# Patient Record
Sex: FEMALE | Race: WHITE | Employment: FULL TIME | URBAN - METROPOLITAN AREA
[De-identification: names, ages, dates, MRNs, and addresses within clinical notes are randomized per-mention and may not be internally consistent; named-entity substitution may affect disease eponyms.]

---

## 2017-10-23 ENCOUNTER — TELEPHONE (OUTPATIENT)
Dept: FAMILY MEDICINE CLINIC | Age: 62
End: 2017-10-23

## 2018-06-27 ENCOUNTER — OFFICE VISIT (OUTPATIENT)
Dept: ORTHOPEDIC SURGERY | Age: 63
End: 2018-06-27

## 2018-06-27 VITALS
SYSTOLIC BLOOD PRESSURE: 152 MMHG | HEART RATE: 63 BPM | DIASTOLIC BLOOD PRESSURE: 82 MMHG | HEIGHT: 55 IN | BODY MASS INDEX: 60.17 KG/M2 | WEIGHT: 260 LBS

## 2018-06-27 DIAGNOSIS — M51.36 DDD (DEGENERATIVE DISC DISEASE), LUMBAR: ICD-10-CM

## 2018-06-27 DIAGNOSIS — M54.16 LUMBAR RADICULITIS: ICD-10-CM

## 2018-06-27 DIAGNOSIS — M54.5 LOW BACK PAIN, UNSPECIFIED BACK PAIN LATERALITY, UNSPECIFIED CHRONICITY, WITH SCIATICA PRESENCE UNSPECIFIED: Primary | ICD-10-CM

## 2018-06-27 PROCEDURE — 99203 OFFICE O/P NEW LOW 30 MIN: CPT | Performed by: PHYSICIAN ASSISTANT

## 2018-06-27 RX ORDER — PREDNISONE 10 MG/1
TABLET ORAL
Qty: 26 TABLET | Refills: 0 | Status: SHIPPED | OUTPATIENT
Start: 2018-06-27 | End: 2018-08-20

## 2018-06-28 ENCOUNTER — TELEPHONE (OUTPATIENT)
Dept: ORTHOPEDIC SURGERY | Age: 63
End: 2018-06-28

## 2018-07-11 ENCOUNTER — HOSPITAL ENCOUNTER (OUTPATIENT)
Dept: PHYSICAL THERAPY | Age: 63
Discharge: HOME | End: 2018-07-12
Admitting: PHYSICIAN ASSISTANT

## 2018-07-11 NOTE — FLOWSHEET NOTE
Flexion      Lumbar Ext      Lumbar SB L/R      Lumbar rotation L/R     Strength Hip ext      ABD      TrA       RESTRICTIONS/PRECAUTIONS: none    Exercises/Interventions:     Therapeutic Ex sets/reps comments   Prone prop 2 x 2 min HEP   Prone press up 2 x 10 HEP   Prone hip ext 2 x 10 HEP   TA brace 2 x 10 : 5\" HEP   Clamshell 2 x 10 HEP                                                          Manual Intervention      PROM hips, HS and ITB stretch 5 min Right   Central PA L1-S1, Si 4 min                                  NMR re-education      TA activation                           Therapeutic Exercise and NMR EXR  [x] (05486) Provided verbal/tactile cueing for activities related to strengthening, flexibility, endurance, ROM  for improvements in proximal hip and core control with self care, mobility, lifting and ambulation.  [] (42435) Provided verbal/tactile cueing for activities related to improving balance, coordination, kinesthetic sense, posture, motor skill, proprioception  to assist with core control in self care, mobility, lifting, and ambulation.      Therapeutic Activities:    [] (51428 or 44552) Provided verbal/tactile cueing for activities related to improving balance, coordination, kinesthetic sense, posture, motor skill, proprioception and motor activation to allow for proper function  with self care and ADLs  [] (01907) Provided training and instruction to the patient for proper core and proximal hip recruitment and positioning with ambulation re-education     Home Exercise Program:    [x] (79172) Reviewed/Progressed HEP activities related to strengthening, flexibility, endurance, ROM of core, proximal hip and LE for functional self-care, mobility, lifting and ambulation   [] (29715) Reviewed/Progressed HEP activities related to improving balance, coordination, kinesthetic sense, posture, motor skill, proprioception of core, proximal hip and LE for self care, mobility, lifting, and ambulation symptoms or restriction. 5. Patient will be able to stand for 1+ hour with no increase in symptoms    New or Updated Goals (if applicable):  [x] No change to goals established upon initial eval/last progress note:  New Goals:    Progression Towards Functional goals:   [] Patient is progressing as expected towards functional goals listed. [] Progression is slowed due to complexities listed. [] Progression has been slowed due to co-morbidities.   [x] Plan just implemented, too soon to assess goals progression  [] Other:     ASSESSMENT:    [] Improvement noted relative to goals:  [] No Improvement noted related to goals:  Summary/Patient's response to treatment: See Eval    Treatment/Activity Tolerance:  [x] Patient tolerated treatment well [] Patient limited by fatique  [] Patient limited by pain  [] Patient limited by other medical complications  [] Other:     Prognosis: [x] Good [] Fair  [] Poor    Patient Requires Follow-up: [x] Yes  [] No    PLAN: See eval  [] Continue per plan of care [] Alter current plan (see comments)  [x] Plan of care initiated [] Hold pending MD visit [] Discharge    Electronically signed by: Lola Rehman PT

## 2018-07-11 NOTE — PLAN OF CARE
[facility], [address]  Phone: [phone]  Fax [phone]    Physical Therapy Certification    Dear Referring Practitioner: Alek Bliss ,    We had the pleasure of evaluating the following patient for physical therapy services at [address] [place] [address]. A summary of our findings can be found in the initial assessment below. This includes our plan of care. If you have any questions or concerns regarding these findings, please do not hesitate to contact me at the office phone number checked above. Thank you for the referral.       Physician Signature:_______________________________Date:__________________  By signing above (or electronic signature), therapists plan is approved by physician    Patient: Alonso Lorenzo   : 1955   MRN: [id number]  Referring Physician: Referring Practitioner: Alek Bliss       Evaluation Date: 2018      Medical Diagnosis Information:  Diagnosis: M54.5 (ICD-10-CM) - Low back pain, unspecified back pain laterality, unspecified chronicity, with sciatica presence unspecified   Treatment Diagnosis: M54.5 Low back pain, unspecified back pain laterality, unspecified chronicity, with sciatica presence unspecified                                         Insurance information: PT Insurance Information: 18 ANTHEM - $50/25 - $[id number] - $0CP - 25PT - 80/20% - NEED AUTH AFTER 25V     Precautions/ Contra-indications: none  Latex Allergy:  [x]NO      []YES  Preferred Language for Healthcare:   [x]English       []other:    SUBJECTIVE: Patient stated complaint: 4 weeks ago could not move in the morning due to Right leg being so tight and painful, massage therapist helped did dry needling, Had steroid pack did not due a whole lot. Getting a little better each day but still having NT down leg into foot, pain and muscle cramps present throughout right leg. Limiting ADLs    Can sit, cannot stand or walk much. Relevant Medical History:unremarkable   Functional Disability Index/G-Codes:  PT G-Codes  Functional Assessment Tool Used: Modified Oswestry  Score: 30%  Functional Limitation: Changing and maintaining body position  Changing and Maintaining Body Position Current Status (): At least 20 percent but less than 40 percent impaired, limited or restricted  Changing and Maintaining Body Position Goal Status ():  At least 1 percent but less than 20 percent impaired, limited or restricted    Pain Scale: 5/10  Easing factors: rest, prone  Provocative factors: prolonged activity     Type: [x]Constant   []Intermittent  []Radiating []Localized []other:     Numbness/Tingling: down right leg into foot    Occupation/School: full time     Living Status/Prior Level of Function: Independent with ADLs and IADLs, walking, yoga    OBJECTIVE:     ROM     LUMBAR FLEX Gets tight at knees, able to slowly get to toes    LUMBAR EXT No issue    Sidebend To knee     Rotation 80%      LEFT RIGHT   HIP Flex WNL    HIP Abd     HIP ER     HIP IR     Knee Flex     Knee ext     Hamstring FLEX Lacking 30 on R LE    Piriformis                Strength  LEFT RIGHT   MfA     TrA     HIP Flexors 4    HIP Abductors 3+    HIP ER     Hip IR     Knee EXT (quad) 4+    Knee Flex (HS) 4+    Ankle DF     Ankle PF     Great Toe Ext       Reflexes/Sensation:    [x]Dermatomes/Myotomes intact    []UE Reflexes     []Normal []Hypo      []Hyper   []LE Reflexes     []Normal []Hypo      []Hyper   []Babinski/Clonus/Hoffmans:    []Other:    Joint mobility:    [x]Normal    []Hypo   []Hyper    Palpation: Increased tissue tension in R pirformis, tight right HS, min TTP over spinous process with central PA T10-S1 and SI joint    Functional Mobility/Transfers: independent     Posture: forward head, anterior pelvic tilt, decreased WB on R LE    Bandages/Dressings/Incisions: none    Gait: (include devices/WB status) assessment instrument and/or measurable assessment of functional outcome. [x] EVAL (LOW) 23213 (typically 20 minutes face-to-face)  [] EVAL (MOD) 30391 (typically 30 minutes face-to-face)  [] EVAL (HIGH) 02945 (typically 45 minutes face-to-face)  [] RE-EVAL         PLAN: Begin PT focusing on: proximal hip mobilizations, LB mobs, LB core activation, proximal hip activation, and HEP    Frequency/Duration:  1-2 days per week for 10 Weeks:  Interventions:  [x]  Therapeutic exercise including: strength training, ROM, for LE, Glutes and core   [x]  NMR activation and proprioception for glutes , LE and Core   [x]  Manual therapy as indicated for Hip complex, LE and spine to include: Dry Needling/IASTM, STM, PROM, Gr I-IV mobilizations, manipulation. [x]  Modalities as needed that may include: thermal agents, E-stim, Biofeedback, US, iontophoresis as indicated  [x]  Patient education on joint protection, postural re-education, activity modification, progression of HEP. HEP instruction: (see scanned forms)    GOALS:  Patient stated goal: Patient would like to get stronger and alleviate pain    Therapist goals for Patient:   Short Term Goals: To be achieved in: 2 weeks  1. Independent in HEP and progression per patient tolerance, in order to prevent re-injury. 2. Patient will have a decrease in pain to facilitate improvement in movement, function, and ADLs as indicated by Functional Deficits. Long Term Goals: To be achieved in: 10 weeks  1. Disability index score of 20% or less for the modified oswestry  to assist with reaching prior level of function. 2. Patient will demonstrate increased AROM to WNL, good LS mobility, good hip ROM to allow for proper joint functioning as indicated by patients Functional Deficits. 3. Patient will demonstrate an increase in Strength to good proximal hip and core activation to allow for proper functional mobility as indicated by patients Functional Deficits.    4. Patient will

## 2018-07-13 ENCOUNTER — HOSPITAL ENCOUNTER (OUTPATIENT)
Dept: PHYSICAL THERAPY | Age: 63
Discharge: HOME | End: 2018-07-14
Admitting: PHYSICIAN ASSISTANT

## 2018-07-13 NOTE — FLOWSHEET NOTE
[facility], [address]  Phone: [phone]  Fax [phone]    Physical Therapy Daily Treatment Note  Date:  2018    Patient Name:  Corina Peraza    :  1955  MRN: [id number]  Restrictions/Precautions:    Physician Information:  Referring Practitioner: Barney Palm   Medical/Treatment Diagnosis Information:  · Diagnosis: M54.5 (ICD-10-CM) - Low back pain, unspecified back pain laterality, unspecified chronicity, with sciatica presence unspecified   (right sided)  Treatment Diagnosis:M54.5 Low back pain, unspecified back pain laterality, unspecified chronicity, with sciatica presence unspecified[x] Conservative / [] Surgical - DOS:  Therapy Diagnosis/Practice Pattern:  Practice Pattern F: Spinal Disorders  Insurance/Certification information:  PT Insurance Information: 18 ANTHEM - $50/25 - $1305DED - $0CP - 25PT - 80/20% - NEED AUTH AFTER 25V  Plan of care signed: [x] YES  [] NO  Number of Comorbidities:  [x]0     []1-2    []3+  Date of Patient follow up with Physician:     G-Code (if applicable):      Date G-Code Applied:         Progress Note: [x]  Yes  []  No  Next due by: Visit #10        Latex Allergy:  [x]NO      []YES  Preferred Language for Healthcare:   [x]English       []other:    Visit # Insurance Allowable Reporting Period   2 25 then needs auth Begin Date: 2018               End Date:      RECERT DUE BY: 10 weeks    SUBJECTIVE:  Pt only has pain with standing and walking. Pt gets relief with sitting. OBJECTIVE: See eval  Observation:Has relief of symptoms with repetitive SLR.     Palpation:     Test used Initial score Current Score   Pain Summary VAS     Functional questionnaire [place]     ROM Lumbar Flexion      Lumbar Ext      Lumbar SB L/R      Lumbar rotation L/R     Strength Hip ext      ABD      TrA       RESTRICTIONS/PRECAUTIONS: none    Exercises/Interventions: Therapeutic Ex sets/reps comments   Prone prop 2 x 2 min HEP   Prone press up 2 x 10 HEP   Prone hip ext 2 x 10 HEP   TA brace 2 x 10 : 5\" HEP   Clamshell 2 x 10 HEP   bridges X 15    Mod Fig 4 s 3x  :20    Piriformis s 3x  ;20          SB DKC/    SB  LTR X 20/   X 10     Slouch correct X 10 Caused more tingling   Incline s 4x  ;20                         Manual Intervention      PROM hips, HS and ITB stretch 5 min Right   Central PA L1-S1, Si 4 min         Neural glides SLR X 10    STM of piriformis with ball 4 min    STM of ITB and HS  Roller   4 min              NMR re-education      TA activation                           Therapeutic Exercise and NMR EXR  [x] (49621) Provided verbal/tactile cueing for activities related to strengthening, flexibility, endurance, ROM  for improvements in proximal hip and core control with self care, mobility, lifting and ambulation.  [] (20009) Provided verbal/tactile cueing for activities related to improving balance, coordination, kinesthetic sense, posture, motor skill, proprioception  to assist with core control in self care, mobility, lifting, and ambulation.      Therapeutic Activities:    [] (60221 or 65602) Provided verbal/tactile cueing for activities related to improving balance, coordination, kinesthetic sense, posture, motor skill, proprioception and motor activation to allow for proper function  with self care and ADLs  [] (32127) Provided training and instruction to the patient for proper core and proximal hip recruitment and positioning with ambulation re-education     Home Exercise Program:    [x] (78695) Reviewed/Progressed HEP activities related to strengthening, flexibility, endurance, ROM of core, proximal hip and LE for functional self-care, mobility, lifting and ambulation   [] (21686) Reviewed/Progressed HEP activities related to improving balance, coordination, kinesthetic sense, posture, motor skill, proprioception of core, proximal hip and LE for self care, mobility, lifting, and ambulation      Manual Treatments:  PROM / STM / Oscillations-Mobs:  G-I, II, III, IV (PA's, Inf., Post.)  [x] (08266) Provided manual therapy to mobilize proximal hip and LS spine soft tissue/joints for the purpose of modulating pain, promoting relaxation,  increasing ROM, reducing/eliminating soft tissue swelling/inflammation/restriction, improving soft tissue extensibility and allowing for proper ROM for normal function with self care, mobility, lifting and ambulation. Modalities:   Esu interferential  X 15 min. Charges:  Timed Code Treatment Minutes: 30   Total Treatment Minutes: 45     [] EVAL (LOW) 13267 (typically 20 minutes face-to-face)  [] EVAL (MOD) 56114 (typically 30 minutes face-to-face)  [] EVAL (HIGH) 01415 (typically 45 minutes face-to-face)  [] RE-EVAL     [x] WE(97737) x  1   [] IONTO  [] NMR (39731) x      [] VASO  [x] Manual (57511) x  1    [] Other:  [] TA x       [] Mech Traction (50667)  [] ES(attended) (11992)      [x] ES (un) (72437):     Goals: Patient stated goal: Patient would like to get stronger and alleviate pain    Therapist goals for Patient:   Short Term Goals: To be achieved in: 2 weeks  1. Independent in HEP and progression per patient tolerance, in order to prevent re-injury. 2. Patient will have a decrease in pain to facilitate improvement in movement, function, and ADLs as indicated by Functional Deficits. Long Term Goals: To be achieved in: 10 weeks  1. Disability index score of 20% or less for the modified oswestry  to assist with reaching prior level of function. 2. Patient will demonstrate increased AROM to WNL, good LS mobility, good hip ROM to allow for proper joint functioning as indicated by patients Functional Deficits. 3. Patient will demonstrate an increase in Strength to good proximal hip and core activation to allow for proper functional mobility as indicated by patients Functional Deficits.    4. Patient will return to ascend/descend stairs with reciprocal pattern without increased symptoms or restriction. 5. Patient will be able to stand for 1+ hour with no increase in symptoms    New or Updated Goals (if applicable):  [x] No change to goals established upon initial eval/last progress note:  New Goals:    Progression Towards Functional goals:   [] Patient is progressing as expected towards functional goals listed. [] Progression is slowed due to complexities listed. [] Progression has been slowed due to co-morbidities.   [x] Plan just implemented, too soon to assess goals progression  [] Other:     ASSESSMENT:    [] Improvement noted relative to goals:  [] No Improvement noted related to goals:  Summary/Patient's response to treatment: See Eval    Treatment/Activity Tolerance:  [x] Patient tolerated treatment well [] Patient limited by fatique  [] Patient limited by pain  [] Patient limited by other medical complications  [] Other:     Prognosis: [x] Good [] Fair  [] Poor    Patient Requires Follow-up: [x] Yes  [] No    PLAN: See eval  [] Continue per plan of care [] Alter current plan (see comments)  [x] Plan of care initiated [] Hold pending MD visit [] Discharge    Electronically signed by: Grace tSein, PT

## 2018-07-17 ENCOUNTER — HOSPITAL ENCOUNTER (OUTPATIENT)
Dept: PHYSICAL THERAPY | Age: 63
Setting detail: THERAPIES SERIES
Discharge: HOME | End: 2018-07-17
Payer: COMMERCIAL

## 2018-07-17 PROCEDURE — G0283 ELEC STIM OTHER THAN WOUND: HCPCS

## 2018-07-17 PROCEDURE — 97140 MANUAL THERAPY 1/> REGIONS: CPT

## 2018-07-17 PROCEDURE — 97110 THERAPEUTIC EXERCISES: CPT

## 2018-07-17 NOTE — FLOWSHEET NOTE
posture, motor skill, proprioception of core, proximal hip and LE for self care, mobility, lifting, and ambulation      Manual Treatments:  PROM / STM / Oscillations-Mobs:  G-I, II, III, IV (PA's, Inf., Post.)  [x] (60824) Provided manual therapy to mobilize proximal hip and LS spine soft tissue/joints for the purpose of modulating pain, promoting relaxation,  increasing ROM, reducing/eliminating soft tissue swelling/inflammation/restriction, improving soft tissue extensibility and allowing for proper ROM for normal function with self care, mobility, lifting and ambulation. Modalities:   Esu interferential  X 15 min. Charges:  Timed Code Treatment Minutes: 40   Total Treatment Minutes: 55     [] EVAL (LOW) 97135 (typically 20 minutes face-to-face)  [] EVAL (MOD) 51365 (typically 30 minutes face-to-face)  [] EVAL (HIGH) 47723 (typically 45 minutes face-to-face)  [] RE-EVAL     [x] BJ(15414) x  2   [] IONTO  [] NMR (22733) x      [] VASO  [x] Manual (50714) x  1    [] Other:  [] TA x       [] Mech Traction (89598)  [] ES(attended) (48705)      [x] ES (un) (37328):     Goals: Patient stated goal: Patient would like to get stronger and alleviate pain    Therapist goals for Patient:   Short Term Goals: To be achieved in: 2 weeks  1. Independent in HEP and progression per patient tolerance, in order to prevent re-injury. 2. Patient will have a decrease in pain to facilitate improvement in movement, function, and ADLs as indicated by Functional Deficits. Long Term Goals: To be achieved in: 10 weeks  1. Disability index score of 20% or less for the modified oswestry  to assist with reaching prior level of function. 2. Patient will demonstrate increased AROM to WNL, good LS mobility, good hip ROM to allow for proper joint functioning as indicated by patients Functional Deficits.    3. Patient will demonstrate an increase in Strength to good proximal hip and core activation to allow for proper functional mobility as indicated by patients Functional Deficits. 4. Patient will return to ascend/descend stairs with reciprocal pattern without increased symptoms or restriction. 5. Patient will be able to stand for 1+ hour with no increase in symptoms    New or Updated Goals (if applicable):  [x] No change to goals established upon initial eval/last progress note:  New Goals:    Progression Towards Functional goals:   [] Patient is progressing as expected towards functional goals listed. [] Progression is slowed due to complexities listed. [] Progression has been slowed due to co-morbidities.   [x] Plan just implemented, too soon to assess goals progression  [] Other:     ASSESSMENT:    [] Improvement noted relative to goals:  [] No Improvement noted related to goals:  Summary/Patient's response to treatment: See Eval    Treatment/Activity Tolerance:  [x] Patient tolerated treatment well [] Patient limited by fatique  [] Patient limited by pain  [] Patient limited by other medical complications  [] Other:     Prognosis: [x] Good [] Fair  [] Poor    Patient Requires Follow-up: [x] Yes  [] No    PLAN: See eval  [] Continue per plan of care [] Alter current plan (see comments)  [x] Plan of care initiated [] Hold pending MD visit [] Discharge    Electronically signed by: Jose Enrique London PT

## 2018-07-18 ENCOUNTER — OFFICE VISIT (OUTPATIENT)
Dept: ORTHOPEDIC SURGERY | Age: 63
End: 2018-07-18

## 2018-07-18 VITALS
SYSTOLIC BLOOD PRESSURE: 136 MMHG | DIASTOLIC BLOOD PRESSURE: 81 MMHG | HEIGHT: 72 IN | BODY MASS INDEX: 35.21 KG/M2 | HEART RATE: 61 BPM | WEIGHT: 260 LBS

## 2018-07-18 DIAGNOSIS — M54.16 LUMBAR RADICULITIS: Primary | ICD-10-CM

## 2018-07-18 PROCEDURE — 99214 OFFICE O/P EST MOD 30 MIN: CPT | Performed by: PHYSICAL MEDICINE & REHABILITATION

## 2018-07-18 NOTE — PROGRESS NOTES
Follow up [address]:    Chief Complaint   Patient presents with    Back Pain     F/u Lumbar MRI       HISTORY OF PRESENT ILLNESS:                The patient is a 65 y.o. female Claude Cress, Friend of Dr Fabiana Ramirez, reports a 6 week history of atraumatic aching and stabbing right buttock pain radiating into the posterior thigh/calf to the foot with numbness. Follow-up MRI. Symptoms of improving but right calf burning pain with standing or walking more than 5-10 minutes. Possible buttock right thigh pain resolved. No bowel or bladder changes    . Pain Assessment  Location of Pain: Back  Severity of Pain: 7  Quality of Pain: Aching  Duration of Pain: Persistent  Frequency of Pain: Intermittent  Aggravating Factors: Standing, Walking  Limiting Behavior: Yes  Relieving Factors: Rest  Result of Injury: No  Work-Related Injury: No  Are there other pain locations you wish to document?: No    The pain assessment was noted & reviewed in the medical record today. Current/Past Treatment:   · Physical Therapy: YES with improvement  · Chiropractic: YES     · Injection: no  · Medications: Aleve 2 b.i.d. and Pred taper  · Surgery/Consult: no    Work Status/Functionality:      Past Medical History: Medical history form was reviewed today & scanned into the media tab  No past medical history on file. Past Surgical History:     No past surgical history on file. Current Medications:     Current Outpatient Prescriptions:     predniSONE (DELTASONE) 10 MG tablet, SIG: iii po BID x 2 days then ii po BID x 2 days then i po BID x 2 days then i po qd x 2 days, Disp: 26 tablet, Rfl: 0  Allergies:  Patient has no known allergies. Social History:    reports that she has never smoked. She has never used smokeless tobacco. She reports that she does not use drugs. Family History:   No family history on file.     REVIEW OF SYSTEMS: Full ROS noted & scanned   CONSTITUTIONAL: Denies unexplained weight loss, fevers, chills or fatigue  NEUROLOGICAL: Denies unsteady gait or progressive weakness  MUSCULOSKELETAL: Denies joint swelling or redness  PSYCHOLOGICAL: Denies anxiety, depression   SKIN: Denies skin changes, delayed healing, rash, itching   HEMATOLOGIC: Denies easy bleeding or bruising  ENDOCRINE: Denies excessive thirst, urination, heat/cold  RESPIRATORY: Denies current dyspnea, cough  GI: Denies nausea, vomiting, diarrhea   : Denies bowel or bladder issues       PHYSICAL EXAM:    Vitals: Blood pressure 136/81, pulse 61, height 6' 1\" (1.854 m), weight 260 lb (117.9 kg). GENERAL EXAM:  · General Apparence: Patient is adequately groomed with no evidence of malnutrition. · Orientation: The patient is oriented to time, place and person. · Mood & Affect:The patient's mood and affect are appropriate   · Vascular: Examination reveals no swelling tenderness in upper or lower extremities. Good capillary refill  · Lymphatic: The lymphatic examination bilaterally reveals all areas to be without enlargement or induration  · Sensation: Sensation is intact without deficit  · Coordination/Balance: Good coordination     LUMBAR/SACRAL EXAMINATION:  · Inspection: Local inspection shows no step-off or bruising. Lumbar alignment is normal.  Sagittal and Coronal balance is neutral.      · Palpation:   No evidence of tenderness at the midline. No tenderness bilaterally at the paraspinal or trochanters. There is no step-off or paraspinal spasm. · Range of Motion:  Mild loss of flexion and extension  · Strength:   Strength testing is 5/5 in all muscle groups tested. .  Leg length and pelvis level.  0 out of 5 Rose's signs. · Skin: There are no rashes, ulcerations or lesions. · Reflexes: Reflexes are symmetrically 2+ at the patellar and trace ankle tendons Clonus absent bilaterally at the feet.   · Gait & station: Normal unassisted  · Additional Examinations:   · RIGHT LOWER EXTREMITY: Inspection/examination of the right lower extremity does not show any tenderness, deformity or injury. Range of motion is full. There is no gross instability. There are no rashes, ulcerations or lesions. Strength and tone are normal.  ·   · LEFT LOWER EXTREMITY:  Inspection/examination of the left lower extremity does not show any tenderness, deformity or injury. Range of motion is full. There is no gross instability. There are no rashes, ulcerations or lesions.   Strength and tone are normal.        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

## 2018-07-19 ENCOUNTER — HOSPITAL ENCOUNTER (OUTPATIENT)
Dept: PHYSICAL THERAPY | Age: 63
Setting detail: THERAPIES SERIES
Discharge: HOME | End: 2018-07-19
Payer: COMMERCIAL

## 2018-07-19 PROCEDURE — 97110 THERAPEUTIC EXERCISES: CPT

## 2018-07-19 PROCEDURE — 97140 MANUAL THERAPY 1/> REGIONS: CPT

## 2018-07-19 PROCEDURE — G0283 ELEC STIM OTHER THAN WOUND: HCPCS

## 2018-07-19 NOTE — FLOWSHEET NOTE
hip and LE for functional self-care, mobility, lifting and ambulation   [] (85512) Reviewed/Progressed HEP activities related to improving balance, coordination, kinesthetic sense, posture, motor skill, proprioception of core, proximal hip and LE for self care, mobility, lifting, and ambulation      Manual Treatments:  PROM / STM / Oscillations-Mobs:  G-I, II, III, IV (PA's, Inf., Post.)  [x] (70662) Provided manual therapy to mobilize proximal hip and LS spine soft tissue/joints for the purpose of modulating pain, promoting relaxation,  increasing ROM, reducing/eliminating soft tissue swelling/inflammation/restriction, improving soft tissue extensibility and allowing for proper ROM for normal function with self care, mobility, lifting and ambulation. Modalities:   Esu interferential  X 15 min. Charges:  Timed Code Treatment Minutes: 45   Total Treatment Minutes: 60     [] EVAL (LOW) 49532 (typically 20 minutes face-to-face)  [] EVAL (MOD) 24647 (typically 30 minutes face-to-face)  [] EVAL (HIGH) 73185 (typically 45 minutes face-to-face)  [] RE-EVAL     [x] UC(13753) x  2   [] IONTO  [] NMR (44791) x      [] VASO  [x] Manual (45778) x  1    [] Other:  [] TA x       [] Mech Traction (99724)  [] ES(attended) (16277)      [x] ES (un) (52932):     Goals: Patient stated goal: Patient would like to get stronger and alleviate pain    Therapist goals for Patient:   Short Term Goals: To be achieved in: 2 weeks  1. Independent in HEP and progression per patient tolerance, in order to prevent re-injury. 2. Patient will have a decrease in pain to facilitate improvement in movement, function, and ADLs as indicated by Functional Deficits. Long Term Goals: To be achieved in: 10 weeks  1. Disability index score of 20% or less for the modified oswestry  to assist with reaching prior level of function.    2. Patient will demonstrate increased AROM to WNL, good LS mobility, good hip ROM to allow for proper joint functioning as indicated by patients Functional Deficits. 3. Patient will demonstrate an increase in Strength to good proximal hip and core activation to allow for proper functional mobility as indicated by patients Functional Deficits. 4. Patient will return to ascend/descend stairs with reciprocal pattern without increased symptoms or restriction. 5. Patient will be able to stand for 1+ hour with no increase in symptoms    New or Updated Goals (if applicable):  [x] No change to goals established upon initial eval/last progress note:  New Goals:    Progression Towards Functional goals:   [] Patient is progressing as expected towards functional goals listed. [] Progression is slowed due to complexities listed. [] Progression has been slowed due to co-morbidities. [x] Plan just implemented, too soon to assess goals progression  [] Other:     ASSESSMENT:    [] Improvement noted relative to goals:  [] No Improvement noted related to goals:  Summary/Patient's response to treatment: Patient symptoms improving, no issues with HEP. Continue to progress as tolerated.     Treatment/Activity Tolerance:  [x] Patient tolerated treatment well [] Patient limited by fatique  [] Patient limited by pain  [] Patient limited by other medical complications  [] Other:     Prognosis: [x] Good [] Fair  [] Poor    Patient Requires Follow-up: [x] Yes  [] No    PLAN: See eval  [] Continue per plan of care [] Alter current plan (see comments)  [x] Plan of care initiated [] Hold pending MD visit [] Discharge    Electronically signed by: Hector Lange PT

## 2018-07-24 ENCOUNTER — HOSPITAL ENCOUNTER (OUTPATIENT)
Dept: PHYSICAL THERAPY | Age: 63
Setting detail: THERAPIES SERIES
Discharge: HOME | End: 2018-07-24
Payer: COMMERCIAL

## 2018-07-24 PROCEDURE — 97140 MANUAL THERAPY 1/> REGIONS: CPT

## 2018-07-24 PROCEDURE — 97110 THERAPEUTIC EXERCISES: CPT

## 2018-07-24 PROCEDURE — G0283 ELEC STIM OTHER THAN WOUND: HCPCS

## 2018-07-24 NOTE — FLOWSHEET NOTE
[facility], [address] [place] [address]  Phone: [phone]  Fax [phone]    Physical Therapy Daily Treatment Note  Date:  2018    Patient Name:  Dai Perez    :  1955  MRN: [id number]  Restrictions/Precautions:    Physician Information:  Referring Practitioner: Dav Or   Medical/Treatment Diagnosis Information:  · Diagnosis: M54.5 (ICD-10-CM) - Low back pain, unspecified back pain laterality, unspecified chronicity, with sciatica presence unspecified   (right sided)  Treatment Diagnosis:M54.5 Low back pain, unspecified back pain laterality, unspecified chronicity, with sciatica presence unspecified[x] Conservative / [] Surgical - DOS:  Therapy Diagnosis/Practice Pattern:  Practice Pattern F: Spinal Disorders  Insurance/Certification information:  PT Insurance Information: 18 ANTHEM - $50/25 - $[id number] - $0CP - 25PT - 80/20% - NEED AUTH AFTER 25V  Plan of care signed: [x] YES  [] NO  Number of Comorbidities:  [x]0     []1-2    []3+  Date of Patient follow up with Physician:     G-Code (if applicable):      Date G-Code Applied:         Progress Note: [x]  Yes  []  No  Next due by: Visit #10        Latex Allergy:  [x]NO      []YES  Preferred Language for Healthcare:   [x]English       []other:    Visit # Insurance Allowable Reporting Period   5 25 then needs auth Begin Date: 2018               End Date:      RECERT DUE BY: 10 weeks    SUBJECTIVE:  Overall feels improvement. Patient states she has been having most trouble early in morning. Calf cramps up when she gets out of bed, wondering if she should try sleeping in different position, educated to place pillow under knees in supine and have pillow between legs if in sideling. OBJECTIVE:   Observation:Has relief of symptoms with repetitive SLR.     Palpation:     Test used Initial score Current Score   Pain Summary VAS 5    Functional Program:    [x] (76902) Reviewed/Progressed HEP activities related to strengthening, flexibility, endurance, ROM of core, proximal hip and LE for functional self-care, mobility, lifting and ambulation   [] (20510) Reviewed/Progressed HEP activities related to improving balance, coordination, kinesthetic sense, posture, motor skill, proprioception of core, proximal hip and LE for self care, mobility, lifting, and ambulation      Manual Treatments:  PROM / STM / Oscillations-Mobs:  G-I, II, III, IV (PA's, Inf., Post.)  [x] (51236) Provided manual therapy to mobilize proximal hip and LS spine soft tissue/joints for the purpose of modulating pain, promoting relaxation,  increasing ROM, reducing/eliminating soft tissue swelling/inflammation/restriction, improving soft tissue extensibility and allowing for proper ROM for normal function with self care, mobility, lifting and ambulation. Modalities:   Esu interferential  X 15 min. Charges:  Timed Code Treatment Minutes: 45   Total Treatment Minutes: 60     [] EVAL (LOW) 62367 (typically 20 minutes face-to-face)  [] EVAL (MOD) 01749 (typically 30 minutes face-to-face)  [] EVAL (HIGH) 29724 (typically 45 minutes face-to-face)  [] RE-EVAL     [x] CI(27259) x  2   [] IONTO  [] NMR (85067) x      [] VASO  [x] Manual (85903) x  1    [] Other:  [] TA x       [] Mech Traction (31414)  [] ES(attended) (14338)      [x] ES (un) (35549):     Goals: Patient stated goal: Patient would like to get stronger and alleviate pain    Therapist goals for Patient:   Short Term Goals: To be achieved in: 2 weeks  1. Independent in HEP and progression per patient tolerance, in order to prevent re-injury. 2. Patient will have a decrease in pain to facilitate improvement in movement, function, and ADLs as indicated by Functional Deficits. Long Term Goals: To be achieved in: 10 weeks  1.  Disability index score of 20% or less for the modified oswestry  to assist with reaching prior level of function. 2. Patient will demonstrate increased AROM to WNL, good LS mobility, good hip ROM to allow for proper joint functioning as indicated by patients Functional Deficits. 3. Patient will demonstrate an increase in Strength to good proximal hip and core activation to allow for proper functional mobility as indicated by patients Functional Deficits. 4. Patient will return to ascend/descend stairs with reciprocal pattern without increased symptoms or restriction. 5. Patient will be able to stand for 1+ hour with no increase in symptoms    New or Updated Goals (if applicable):  [x] No change to goals established upon initial eval/last progress note:  New Goals:    Progression Towards Functional goals:   [x] Patient is progressing as expected towards functional goals listed. [] Progression is slowed due to complexities listed. [] Progression has been slowed due to co-morbidities. [] Plan just implemented, too soon to assess goals progression  [] Other:     ASSESSMENT:    [] Improvement noted relative to goals:  [] No Improvement noted related to goals:  Summary/Patient's response to treatment: Patient symptoms improving, no issues with HEP. Continue to progress as tolerated.     Treatment/Activity Tolerance:  [x] Patient tolerated treatment well [] Patient limited by fatique  [] Patient limited by pain  [] Patient limited by other medical complications  [] Other:     Prognosis: [x] Good [] Fair  [] Poor    Patient Requires Follow-up: [x] Yes  [] No    PLAN: See eval  [x] Continue per plan of care [] Alter current plan (see comments)  [] Plan of care initiated [] Hold pending MD visit [] Discharge    Electronically signed by: Kylah Wallace PT

## 2018-07-26 ENCOUNTER — HOSPITAL ENCOUNTER (OUTPATIENT)
Dept: PHYSICAL THERAPY | Age: 63
Setting detail: THERAPIES SERIES
Discharge: HOME | End: 2018-07-26
Payer: COMMERCIAL

## 2018-07-26 PROCEDURE — 97110 THERAPEUTIC EXERCISES: CPT

## 2018-07-26 PROCEDURE — 97140 MANUAL THERAPY 1/> REGIONS: CPT

## 2018-07-26 NOTE — FLOWSHEET NOTE
re-education     Home Exercise Program:    [x] (99905) Reviewed/Progressed HEP activities related to strengthening, flexibility, endurance, ROM of core, proximal hip and LE for functional self-care, mobility, lifting and ambulation   [] (57217) Reviewed/Progressed HEP activities related to improving balance, coordination, kinesthetic sense, posture, motor skill, proprioception of core, proximal hip and LE for self care, mobility, lifting, and ambulation      Manual Treatments:  PROM / STM / Oscillations-Mobs:  G-I, II, III, IV (PA's, Inf., Post.)  [x] (13155) Provided manual therapy to mobilize proximal hip and LS spine soft tissue/joints for the purpose of modulating pain, promoting relaxation,  increasing ROM, reducing/eliminating soft tissue swelling/inflammation/restriction, improving soft tissue extensibility and allowing for proper ROM for normal function with self care, mobility, lifting and ambulation. Modalities:    Limited on time    Charges:  Timed Code Treatment Minutes: 45   Total Treatment Minutes: 45     [] EVAL (LOW) 76625 (typically 20 minutes face-to-face)  [] EVAL (MOD) 21562 (typically 30 minutes face-to-face)  [] EVAL (HIGH) 29129 (typically 45 minutes face-to-face)  [] RE-EVAL     [x] XA(62162) x  2   [] IONTO  [] NMR (36281) x      [] VASO  [x] Manual (25624) x  1    [] Other:  [] TA x       [] Mech Traction (00126)  [] ES(attended) (34002)      [] ES (un) (05676):     Goals: Patient stated goal: Patient would like to get stronger and alleviate pain    Therapist goals for Patient:   Short Term Goals: To be achieved in: 2 weeks  1. Independent in HEP and progression per patient tolerance, in order to prevent re-injury. 2. Patient will have a decrease in pain to facilitate improvement in movement, function, and ADLs as indicated by Functional Deficits. Long Term Goals: To be achieved in: 10 weeks  1.  Disability index score of 20% or less for the modified oswestry  to assist with reaching prior level of function. 2. Patient will demonstrate increased AROM to WNL, good LS mobility, good hip ROM to allow for proper joint functioning as indicated by patients Functional Deficits. 3. Patient will demonstrate an increase in Strength to good proximal hip and core activation to allow for proper functional mobility as indicated by patients Functional Deficits. 4. Patient will return to ascend/descend stairs with reciprocal pattern without increased symptoms or restriction. 5. Patient will be able to stand for 1+ hour with no increase in symptoms    New or Updated Goals (if applicable):  [x] No change to goals established upon initial eval/last progress note:  New Goals:    Progression Towards Functional goals:   [x] Patient is progressing as expected towards functional goals listed. [] Progression is slowed due to complexities listed. [] Progression has been slowed due to co-morbidities. [] Plan just implemented, too soon to assess goals progression  [] Other:     ASSESSMENT:    [] Improvement noted relative to goals:  [] No Improvement noted related to goals:  Summary/Patient's response to treatment: Patient symptoms improving, no issues with HEP. Continue to progress as tolerated.     Treatment/Activity Tolerance:  [x] Patient tolerated treatment well [] Patient limited by fatique  [] Patient limited by pain  [] Patient limited by other medical complications  [] Other:     Prognosis: [x] Good [] Fair  [] Poor    Patient Requires Follow-up: [x] Yes  [] No    PLAN: See eval  [x] Continue per plan of care [] Alter current plan (see comments)  [] Plan of care initiated [] Hold pending MD visit [] Discharge    Electronically signed by: Leticia Leyva PT

## 2018-07-31 ENCOUNTER — APPOINTMENT (OUTPATIENT)
Dept: PHYSICAL THERAPY | Age: 63
End: 2018-07-31
Payer: COMMERCIAL

## 2018-08-20 ENCOUNTER — OFFICE VISIT (OUTPATIENT)
Dept: ORTHOPEDIC SURGERY | Age: 63
End: 2018-08-20

## 2018-08-20 VITALS
SYSTOLIC BLOOD PRESSURE: 140 MMHG | WEIGHT: 260 LBS | HEART RATE: 62 BPM | HEIGHT: 72 IN | DIASTOLIC BLOOD PRESSURE: 79 MMHG | BODY MASS INDEX: 35.21 KG/M2

## 2018-08-20 DIAGNOSIS — M54.16 LUMBAR RADICULITIS: Primary | ICD-10-CM

## 2018-08-20 DIAGNOSIS — M51.36 DDD (DEGENERATIVE DISC DISEASE), LUMBAR: ICD-10-CM

## 2018-08-20 DIAGNOSIS — M54.5 LOW BACK PAIN, UNSPECIFIED BACK PAIN LATERALITY, UNSPECIFIED CHRONICITY, WITH SCIATICA PRESENCE UNSPECIFIED: ICD-10-CM

## 2018-08-20 PROCEDURE — 99213 OFFICE O/P EST LOW 20 MIN: CPT | Performed by: PHYSICAL MEDICINE & REHABILITATION

## 2018-08-20 RX ORDER — COVID-19 ANTIGEN TEST
KIT MISCELLANEOUS
COMMUNITY
End: 2020-11-24

## 2018-08-20 RX ORDER — IBUPROFEN 800 MG/1
800 TABLET ORAL
Qty: 90 TABLET | Refills: 2 | Status: SHIPPED | OUTPATIENT
Start: 2018-08-20 | End: 2018-12-07 | Stop reason: SDUPTHER

## 2018-12-07 DIAGNOSIS — M54.16 LUMBAR RADICULITIS: ICD-10-CM

## 2018-12-07 DIAGNOSIS — M51.36 DDD (DEGENERATIVE DISC DISEASE), LUMBAR: ICD-10-CM

## 2018-12-07 DIAGNOSIS — M54.5 LOW BACK PAIN, UNSPECIFIED BACK PAIN LATERALITY, UNSPECIFIED CHRONICITY, WITH SCIATICA PRESENCE UNSPECIFIED: ICD-10-CM

## 2018-12-07 RX ORDER — IBUPROFEN 800 MG/1
TABLET ORAL
Qty: 90 TABLET | Refills: 0 | Status: SHIPPED | OUTPATIENT
Start: 2018-12-07 | End: 2019-01-10 | Stop reason: SDUPTHER

## 2019-01-10 DIAGNOSIS — M51.36 DDD (DEGENERATIVE DISC DISEASE), LUMBAR: ICD-10-CM

## 2019-01-10 DIAGNOSIS — M54.16 LUMBAR RADICULITIS: ICD-10-CM

## 2019-01-10 DIAGNOSIS — M54.5 LOW BACK PAIN, UNSPECIFIED BACK PAIN LATERALITY, UNSPECIFIED CHRONICITY, WITH SCIATICA PRESENCE UNSPECIFIED: ICD-10-CM

## 2019-01-10 RX ORDER — IBUPROFEN 800 MG/1
TABLET ORAL
Qty: 90 TABLET | Refills: 0 | Status: SHIPPED | OUTPATIENT
Start: 2019-01-10

## 2020-11-24 ENCOUNTER — OFFICE VISIT (OUTPATIENT)
Dept: ORTHOPEDIC SURGERY | Age: 65
End: 2020-11-24
Payer: COMMERCIAL

## 2020-11-24 PROCEDURE — 99213 OFFICE O/P EST LOW 20 MIN: CPT | Performed by: PHYSICIAN ASSISTANT

## 2020-11-24 RX ORDER — SULFAMETHOXAZOLE AND TRIMETHOPRIM 800; 160 MG/1; MG/1
1 TABLET ORAL 2 TIMES DAILY
Qty: 20 TABLET | Refills: 0 | Status: ON HOLD | OUTPATIENT
Start: 2020-11-24 | End: 2020-11-29 | Stop reason: HOSPADM

## 2020-11-24 NOTE — PROGRESS NOTES
Chief Complaint    Leg Injury (LEFT shin pain with redness and swelling after falling down steps at [facility] on 11/15/20, increased pain this past Fri with pain especially with WB)      History of Present Illness:  Sharri Gaines is a 62 y.o. female who presents to the after-hours clinic with a new problem. Patient is here with a chief complaint of left lower leg pain and swelling. Patient states that approximately 9 days ago she did fall down some stairs at [facility]. She originally developed mild pain and swelling. This progressed into ecchymosis which eventually started turning yellow. She did rest ice and elevation which caused improvement then 5 days ago started having increased pain swelling and erythema. She denies any fever or chills. Pain Assessment  Location of Pain: Leg  Location Modifiers: Left, Inferior  Severity of Pain: 10(at worst; currentl 2-3/10)  Quality of Pain: Sharp, Other (Comment), Aching(burning)  Duration of Pain: Persistent  Frequency of Pain: Intermittent  Date Pain First Started: 11/15/20  Aggravating Factors: Walking, Standing  Limiting Behavior: Yes  Relieving Factors: Rest, Nsaids  Result of Injury: Yes  Work-Related Injury: No]    Medical History:  History reviewed. No pertinent past medical history. There are no active problems to display for this patient. History reviewed. No pertinent surgical history. History reviewed. No pertinent family history.   Social History     Socioeconomic History    Marital status:      Spouse name: None    Number of children: None    Years of education: None    Highest education level: None   Occupational History    None   Social Needs    Financial resource strain: None    Food insecurity     Worry: None     Inability: None    Transportation needs     Medical: None     Non-medical: None   Tobacco Use    Smoking status: Never Smoker    Smokeless tobacco: Never Used   Substance and Sexual Activity    Alcohol use: None    Drug use: No    Sexual activity: None   Lifestyle    Physical activity     Days per week: None     Minutes per session: None    Stress: None   Relationships    Social connections     Talks on phone: None     Gets together: None     Attends Scientologist service: None     Active member of club or organization: None     Attends meetings of clubs or organizations: None     Relationship status: None    Intimate partner violence     Fear of current or ex partner: None     Emotionally abused: None     Physically abused: None     Forced sexual activity: None   Other Topics Concern    None   Social History Narrative    None     Current Outpatient Medications   Medication Sig Dispense Refill    sulfamethoxazole-trimethoprim (BACTRIM DS;SEPTRA DS) 800-160 MG per tablet Take 1 tablet by mouth 2 times daily for 10 days 20 tablet 0    ibuprofen (ADVIL;MOTRIN) 800 MG tablet TAKE 1 TABLET BY MOUTH THREE TIMES DAILY WITH MEALS 90 tablet 0     No current facility-administered medications for this visit. Review of Systems:  Relevant point review of systems dated 11/24/2020 was reviewed and all pertinent positives were reviewed and are available in the patient's chart under the media tab      Vital Signs: There were no vitals taken for this visit. General Exam:   Constitutional: Patient is adequately groomed with no evidence of malnutrition  DTRs: Deep tendon reflexes are intact  Mental Status: The patient is oriented to time, place and person. The patient's mood and affect are appropriate. Lymphatic: The lymphatic examination bilaterally reveals all areas to be without enlargement or induration. Vascular: Examination reveals no swelling or calf tenderness. Peripheral pulses are palpable and 2+. Neurological: The patient has good coordination. There is no weakness or sensory deficit. Left lower leg Examination:    Inspection:  Swelling and mild ecchymosis surrounding the midshaft of the lower leg.   Patient does have more significant erythema in the region and what appears to be 2 small breaks in the skin. Palpation: Tenderness to the patient diffusely over the area of erythema. No calf pain. Range of Motion:  Limited range of motion due to pain and swelling    Strength:  Intact but limited due to pain and swelling    Special Tests: Negative anterior drawer. Negative talar tilt. Negative Homans' sign. Gait: Antalgic    Reflex 2+ and symmetric    Additional Comments:       Additional Examinations:         Right Lower Extremity: Examination of the right lower extremity does not show any tenderness, deformity or injury. Range of motion is unremarkable. There is no gross instability. There are no rashes, ulcerations or lesions. Strength and tone are normal.     Radiology:     X-rays obtained and reviewed in office:  Views 2 views including AP and LAT  Location left lower leg ankle  Impression no evidence of any acute fractures or dislocations. There is soft tissue swelling noted. Ankle mortise is congruent well-maintained            Impression:  Encounter Diagnoses   Name Primary?  Pain in left lower leg Yes    Hematoma of leg, left, initial encounter     Cellulitis of left lower extremity        Office Procedures:  Orders Placed This Encounter   Procedures    XR TIBIA FIBULA LEFT (2 VIEWS)     Standing Status:   Future     Number of Occurrences:   1     Standing Expiration Date:   12/24/2020       Treatment Plan:   Patient originally had a hematoma which has progressed to lower extremity cellulitis. Have educated the patient on applying Polysporin and a light dressing to decrease bacterial counts on the skin. She is placed on Bactrim. She will continue to rest ice and elevate. I will have her see Dr. Salvador Holland on Friday. Patient is encouraged that if the erythema gets worse or she develops fever-chills she should call the on-call physician or present to the emergency room.

## 2020-11-26 ENCOUNTER — APPOINTMENT (OUTPATIENT)
Dept: GENERAL RADIOLOGY | Age: 65
DRG: 581 | End: 2020-11-26
Payer: COMMERCIAL

## 2020-11-26 ENCOUNTER — HOSPITAL ENCOUNTER (INPATIENT)
Age: 65
LOS: 3 days | Discharge: HOME | DRG: 581 | End: 2020-11-29
Attending: EMERGENCY MEDICINE | Admitting: HOSPITALIST
Payer: COMMERCIAL

## 2020-11-26 PROBLEM — R03.0 ELEVATED BP WITHOUT DIAGNOSIS OF HYPERTENSION: Status: ACTIVE | Noted: 2020-11-26

## 2020-11-26 PROBLEM — E66.9 CLASS 2 OBESITY IN ADULT: Status: ACTIVE | Noted: 2020-11-26

## 2020-11-26 PROBLEM — L03.119: Status: ACTIVE | Noted: 2020-11-26

## 2020-11-26 LAB
A/G RATIO: 1.1 (ref 1.1–2.2)
ALBUMIN SERPL-MCNC: 3.9 G/DL (ref 3.4–5)
ALP BLD-CCNC: 91 U/L (ref 40–129)
ALT SERPL-CCNC: 43 U/L (ref 10–40)
ANION GAP SERPL CALCULATED.3IONS-SCNC: 11 MMOL/L (ref 3–16)
AST SERPL-CCNC: 42 U/L (ref 15–37)
BASOPHILS ABSOLUTE: 0 K/UL (ref 0–0.2)
BASOPHILS RELATIVE PERCENT: 0.7 %
BILIRUB SERPL-MCNC: 0.8 MG/DL (ref 0–1)
BUN BLDV-MCNC: 23 MG/DL (ref 7–20)
CALCIUM SERPL-MCNC: 9.2 MG/DL (ref 8.3–10.6)
CHLORIDE BLD-SCNC: 104 MMOL/L (ref 99–110)
CO2: 23 MMOL/L (ref 21–32)
CREAT SERPL-MCNC: 0.9 MG/DL (ref 0.6–1.2)
D DIMER: 575 NG/ML DDU (ref 0–229)
EOSINOPHILS ABSOLUTE: 0.2 K/UL (ref 0–0.6)
EOSINOPHILS RELATIVE PERCENT: 3.3 %
GFR AFRICAN AMERICAN: >60
GFR NON-AFRICAN AMERICAN: >60
GLOBULIN: 3.4 G/DL
GLUCOSE BLD-MCNC: 112 MG/DL (ref 70–99)
HCT VFR BLD CALC: 40.5 % (ref 36–48)
HEMOGLOBIN: 14.1 G/DL (ref 12–16)
LACTIC ACID: 1 MMOL/L (ref 0.4–2)
LYMPHOCYTES ABSOLUTE: 0.9 K/UL (ref 1–5.1)
LYMPHOCYTES RELATIVE PERCENT: 16.8 %
MCH RBC QN AUTO: 30.6 PG (ref 26–34)
MCHC RBC AUTO-ENTMCNC: 34.7 G/DL (ref 31–36)
MCV RBC AUTO: 87.9 FL (ref 80–100)
MONOCYTES ABSOLUTE: 0.5 K/UL (ref 0–1.3)
MONOCYTES RELATIVE PERCENT: 8.6 %
NEUTROPHILS ABSOLUTE: 3.9 K/UL (ref 1.7–7.7)
NEUTROPHILS RELATIVE PERCENT: 70.6 %
PDW BLD-RTO: 13.8 % (ref 12.4–15.4)
PLATELET # BLD: 197 K/UL (ref 135–450)
PMV BLD AUTO: 8.3 FL (ref 5–10.5)
POTASSIUM REFLEX MAGNESIUM: 4.5 MMOL/L (ref 3.5–5.1)
RBC # BLD: 4.6 M/UL (ref 4–5.2)
SODIUM BLD-SCNC: 138 MMOL/L (ref 136–145)
TOTAL PROTEIN: 7.3 G/DL (ref 6.4–8.2)
WBC # BLD: 5.6 K/UL (ref 4–11)

## 2020-11-26 PROCEDURE — 96367 TX/PROPH/DG ADDL SEQ IV INF: CPT

## 2020-11-26 PROCEDURE — 87070 CULTURE OTHR SPECIMN AEROBIC: CPT

## 2020-11-26 PROCEDURE — 6360000002 HC RX W HCPCS: Performed by: HOSPITALIST

## 2020-11-26 PROCEDURE — 87040 BLOOD CULTURE FOR BACTERIA: CPT

## 2020-11-26 PROCEDURE — 93005 ELECTROCARDIOGRAM TRACING: CPT | Performed by: HOSPITALIST

## 2020-11-26 PROCEDURE — 10060 I&D ABSCESS SIMPLE/SINGLE: CPT

## 2020-11-26 PROCEDURE — 96365 THER/PROPH/DIAG IV INF INIT: CPT

## 2020-11-26 PROCEDURE — 36415 COLL VENOUS BLD VENIPUNCTURE: CPT

## 2020-11-26 PROCEDURE — 85379 FIBRIN DEGRADATION QUANT: CPT

## 2020-11-26 PROCEDURE — 85025 COMPLETE CBC W/AUTO DIFF WBC: CPT

## 2020-11-26 PROCEDURE — 2580000003 HC RX 258: Performed by: PHYSICIAN ASSISTANT

## 2020-11-26 PROCEDURE — 6360000002 HC RX W HCPCS: Performed by: PHYSICIAN ASSISTANT

## 2020-11-26 PROCEDURE — 73590 X-RAY EXAM OF LOWER LEG: CPT

## 2020-11-26 PROCEDURE — 80053 COMPREHEN METABOLIC PANEL: CPT

## 2020-11-26 PROCEDURE — 83605 ASSAY OF LACTIC ACID: CPT

## 2020-11-26 PROCEDURE — 6370000000 HC RX 637 (ALT 250 FOR IP): Performed by: HOSPITALIST

## 2020-11-26 PROCEDURE — 1200000000 HC SEMI PRIVATE

## 2020-11-26 PROCEDURE — 2580000003 HC RX 258: Performed by: EMERGENCY MEDICINE

## 2020-11-26 PROCEDURE — 2580000003 HC RX 258: Performed by: HOSPITALIST

## 2020-11-26 PROCEDURE — 87205 SMEAR GRAM STAIN: CPT

## 2020-11-26 PROCEDURE — 99284 EMERGENCY DEPT VISIT MOD MDM: CPT

## 2020-11-26 RX ORDER — ONDANSETRON 2 MG/ML
4 INJECTION INTRAMUSCULAR; INTRAVENOUS EVERY 6 HOURS PRN
Status: DISCONTINUED | OUTPATIENT
Start: 2020-11-26 | End: 2020-11-29 | Stop reason: HOSPADM

## 2020-11-26 RX ORDER — OXYCODONE HYDROCHLORIDE 5 MG/1
10 TABLET ORAL EVERY 6 HOURS PRN
Status: DISCONTINUED | OUTPATIENT
Start: 2020-11-26 | End: 2020-11-29 | Stop reason: HOSPADM

## 2020-11-26 RX ORDER — SODIUM CHLORIDE 9 MG/ML
1000 INJECTION, SOLUTION INTRAVENOUS ONCE
Status: COMPLETED | OUTPATIENT
Start: 2020-11-26 | End: 2020-11-26

## 2020-11-26 RX ORDER — ACETAMINOPHEN 325 MG/1
650 TABLET ORAL EVERY 6 HOURS PRN
Status: DISCONTINUED | OUTPATIENT
Start: 2020-11-26 | End: 2020-11-29 | Stop reason: HOSPADM

## 2020-11-26 RX ORDER — POTASSIUM CHLORIDE 20 MEQ/1
40 TABLET, EXTENDED RELEASE ORAL PRN
Status: DISCONTINUED | OUTPATIENT
Start: 2020-11-26 | End: 2020-11-29 | Stop reason: HOSPADM

## 2020-11-26 RX ORDER — ACETAMINOPHEN 650 MG/1
650 SUPPOSITORY RECTAL EVERY 6 HOURS PRN
Status: DISCONTINUED | OUTPATIENT
Start: 2020-11-26 | End: 2020-11-29 | Stop reason: HOSPADM

## 2020-11-26 RX ORDER — ACETAMINOPHEN 500 MG
1000 TABLET ORAL EVERY 6 HOURS PRN
Status: DISCONTINUED | OUTPATIENT
Start: 2020-11-26 | End: 2020-11-29 | Stop reason: HOSPADM

## 2020-11-26 RX ORDER — SODIUM CHLORIDE 0.9 % (FLUSH) 0.9 %
10 SYRINGE (ML) INJECTION PRN
Status: DISCONTINUED | OUTPATIENT
Start: 2020-11-26 | End: 2020-11-29 | Stop reason: HOSPADM

## 2020-11-26 RX ORDER — ONDANSETRON 4 MG/1
4 TABLET, ORALLY DISINTEGRATING ORAL EVERY 8 HOURS PRN
Status: DISCONTINUED | OUTPATIENT
Start: 2020-11-26 | End: 2020-11-29 | Stop reason: HOSPADM

## 2020-11-26 RX ORDER — OXYCODONE HYDROCHLORIDE 5 MG/1
5 TABLET ORAL EVERY 6 HOURS PRN
Status: DISCONTINUED | OUTPATIENT
Start: 2020-11-26 | End: 2020-11-29 | Stop reason: HOSPADM

## 2020-11-26 RX ORDER — POTASSIUM CHLORIDE 7.45 MG/ML
10 INJECTION INTRAVENOUS PRN
Status: DISCONTINUED | OUTPATIENT
Start: 2020-11-26 | End: 2020-11-29 | Stop reason: HOSPADM

## 2020-11-26 RX ORDER — MAGNESIUM SULFATE 1 G/100ML
1 INJECTION INTRAVENOUS PRN
Status: DISCONTINUED | OUTPATIENT
Start: 2020-11-26 | End: 2020-11-29 | Stop reason: HOSPADM

## 2020-11-26 RX ADMIN — VANCOMYCIN HYDROCHLORIDE 1.5 G: 10 INJECTION, POWDER, LYOPHILIZED, FOR SOLUTION INTRAVENOUS at 23:56

## 2020-11-26 RX ADMIN — ENOXAPARIN SODIUM 40 MG: 40 INJECTION SUBCUTANEOUS at 17:48

## 2020-11-26 RX ADMIN — CEFEPIME HYDROCHLORIDE 2 G: 2 INJECTION, POWDER, FOR SOLUTION INTRAVENOUS at 12:59

## 2020-11-26 RX ADMIN — OXYCODONE 10 MG: 5 TABLET ORAL at 16:02

## 2020-11-26 RX ADMIN — ACETAMINOPHEN 1000 MG: 500 TABLET ORAL at 16:01

## 2020-11-26 RX ADMIN — SODIUM CHLORIDE 1000 ML: 9 INJECTION, SOLUTION INTRAVENOUS at 15:13

## 2020-11-26 RX ADMIN — VANCOMYCIN HYDROCHLORIDE 1750 MG: 1 INJECTION, POWDER, LYOPHILIZED, FOR SOLUTION INTRAVENOUS at 13:46

## 2020-11-26 ASSESSMENT — PAIN DESCRIPTION - DESCRIPTORS: DESCRIPTORS: BURNING

## 2020-11-26 ASSESSMENT — PAIN SCALES - GENERAL
PAINLEVEL_OUTOF10: 0
PAINLEVEL_OUTOF10: 0
PAINLEVEL_OUTOF10: 4
PAINLEVEL_OUTOF10: 0
PAINLEVEL_OUTOF10: 10

## 2020-11-26 ASSESSMENT — PAIN DESCRIPTION - ORIENTATION
ORIENTATION: LOWER;LEFT
ORIENTATION: LEFT;LOWER

## 2020-11-26 ASSESSMENT — ENCOUNTER SYMPTOMS
SHORTNESS OF BREATH: 0
COLOR CHANGE: 1
NAUSEA: 0
EYES NEGATIVE: 1
ABDOMINAL PAIN: 0
VOMITING: 0

## 2020-11-26 ASSESSMENT — PAIN DESCRIPTION - LOCATION
LOCATION: LEG
LOCATION: LEG

## 2020-11-26 ASSESSMENT — PAIN DESCRIPTION - PAIN TYPE: TYPE: ACUTE PAIN

## 2020-11-26 NOTE — ED NOTES
Gen Surgery called @ [phone]  Re: abscess   Per Nicol Pyle returned call and spoke to Nicol Barnes @ [address]  11/26/20 9760

## 2020-11-26 NOTE — H&P
for polyuria, dysuria   Hematologic/Lymphatic: Negative for bleeding tendency, easy bruising  Musculoskeletal: Negative for myalgias and arthralgias  Neurologic: Negative for confusion,dysarthria. Skin: Positive swelling and bruising left pretibial region  Psychiatric: Negative for depression,anxiety, agitation. Endocrine: Negative for polydipsia,polyuria,heat /cold intolerance. Past Medical History:   has no past medical history on file. Past Surgical History:   has no past surgical history on file. Medications:  No current facility-administered medications on file prior to encounter. Current Outpatient Medications on File Prior to Encounter   Medication Sig Dispense Refill    sulfamethoxazole-trimethoprim (BACTRIM DS;SEPTRA DS) 800-160 MG per tablet Take 1 tablet by mouth 2 times daily for 10 days 20 tablet 0    ibuprofen (ADVIL;MOTRIN) 800 MG tablet TAKE 1 TABLET BY MOUTH THREE TIMES DAILY WITH MEALS 90 tablet 0       Allergies:  No Known Allergies     Social History:  Patient Lives  reports that she has never smoked. She has never used smokeless tobacco. She reports that she does not use drugs. Family History:  family history is not on file.;  Denies history of familial blood clots    Physical Exam:  BP (!) 149/60   Pulse 59   Temp 98.2 °F (36.8 °C) (Oral)   Resp (!) 6   Ht 6' 1\" (1.854 m)   Wt 270 lb (122.5 kg)   SpO2 96%   BMI 35.62 kg/m²     General appearance:  Appears comfortable. Well nourished  Eyes: Sclera clear, pupils equal  ENT: Moist mucus membranes, no thrush. Trachea midline. Cardiovascular: Regular rhythm, normal S1, S2. No murmur, gallop, rub. No edema in lower extremities  Respiratory: Clear to auscultation bilaterally, no wheeze, good inspiratory effort  Gastrointestinal: Abdomen soft, non-tender, not distended, normal bowel sounds  Musculoskeletal: No cyanosis in digits, neck supple  Neurology: Cranial nerves grossly intact.  Alert and oriented in time, place and suspected will obtain echo  6. As needed Tylenol for mild discomfort and fever; oxycodone to be used sparingly for severe discomfort      DVT prophylaxis-Lovenox 40 units subcu daily  Code status-full code  Diet-General ALEAH  IV access-PIV established in ED      Admit as inpatient. I anticipate hospitalization spanning more than two midnights for investigation and treatment of the above medically necessary diagnoses. Please note that some part of this chart was generated using Dragon dictation software. Although every effort was made to ensure the accuracy of this automated transcription, some errors in transcription may have occurred inadvertently. If you may need any clarification, please do not hesitate to contact me through [facility].        Eliu Lemus MD    11/26/2020 2:31 PM

## 2020-11-26 NOTE — ED PROVIDER NOTES
I independently performed a history and physical on Mary Springer. All diagnostic, treatment, and disposition decisions were made by myself in conjunction with the advanced practice provider. Briefly, this is a 62 y.o. female here for left leg pain, redness. No fevers or chills. No nausea or vomiting. Occurred shortly after hitting her left shin while falling down some steps on the 15th. Began to become red on the 20th. Seen by [facility] on the 24th and started on Bactrim. Did not have any improvement in redness. Has been progressively swelling. No history of prior immunocompromise state. Symptoms moderate to severe intensity. .    On exam,   General: Patient is in no acute distress  Skin: No cyanosis. Roughly 24 cm x 14 cm area of erythema left anterior shin without crepitus. There is tenderness and warmth. Indurated  HEENT: Moist mucous membranes  Heart: Regular rate, regular rhythm  Lung: No respiratory distress  Abdomen: Soft, nontender  Neuro: Moving all extremities, no facial droop, no slurred speech, answers questions appropriately        Screenings            MDM  Patient is a 67-year-old woman who presents with left shin injury. Had x-rays which were unremarkable per patient. Was started on Bactrim 3 days ago and has not had improvement. On bedside ultrasound, I do see a roughly 20 cm x 10 cm fluid collection consistent with abscess with surrounding cellulitic changes. Because the cellulitic changes have been refractory to outpatient antibiotics, I do feel that patient may benefit from inpatient admission for IV antibiotics. Will discuss with admitting teams for best approach to incision and drainage given large area of abscess. -----------    Performed incision and drainage. Cleaned area with alcohol wipes and allowed to completely dry. 1-1/2 cm incision made with 11 blade. Revealed gross body output without purulent discharge. Sent for culture. Area was bandaged.   Not sutured given possibility of infected hematoma. Performed using sterile precautions. No complications. Revealed patient likely has hematoma and not abscess    Patient Referrals:  No follow-up provider specified. Discharge Medications:  New Prescriptions    No medications on file       FINAL IMPRESSION  1. Cellulitis, unspecified cellulitis site    2. Abscess        Pulse 72, temperature 98.2 °F (36.8 °C), temperature source Oral, resp. rate 16, height 6' 1\" (1.854 m), weight 270 lb (122.5 kg), SpO2 96 %. For further details of [facility] emergency department encounter, please see documentation by advanced practice provider, Seamus Suarez.         Gabby Thomas MD  11/26/20 82 Elizabeth Myrick MD  11/27/20 9077

## 2020-11-26 NOTE — PROGRESS NOTES
Pt arrived to room. Alert and oriented, VSS. Admission assessment completed and documented. Pt oriented to room, belongings put away. Told plan of care, all questions answered. Pt denies any needs at this time. Bed locked and in lowest position, call light within reach. Will continue to monitor.

## 2020-11-26 NOTE — ED NOTES
Patient's visitor provided with copy of Emergency Department Visitor Restrictions. Instructed to review while waiting to visit with patient. Visitor verbalized understanding.      Mustapha Elmore RN  11/26/20 7477

## 2020-11-26 NOTE — ED NOTES
Pt hit LLL after falling down the stairs at [facility]. Pt hit LLL on rail pt had some small amount of redness and went to outpatient and was started on oral Cipro ABX. Pt leg getting worse spreading redness and warmth. Pt has a blister on top of leg along with redness and warmth. Pt able to ambulate with steady gait. Pt with positive pedal pulse. Pt with brisk cap refill. Pt with no other c/o pain. Pt does have some old bruising in left foot from fall.      Matt Jimenez RN  11/26/20 9489

## 2020-11-26 NOTE — PLAN OF CARE
Received PS from ED regarding Possible Admission , Forwarded Message to  Dr. Viktoriya Garcia MD  Hospitalist Physician

## 2020-11-26 NOTE — ED NOTES
[facility] @4769  Re: cellulitis LLE, failed outpatient antibiotic therapy  Per Nadeem Leal [facility] @0715  Re: cellulitis LLE, failed outpatient antibiotic therapy   Per Jesus Alberto Langford returned call and spoke with [facility]       Kwasi Green  11/26/20 1300 Moise Davila  11/26/20 0776

## 2020-11-26 NOTE — ED PROVIDER NOTES
[facility]  ED  EMERGENCY DEPARTMENT ENCOUNTER        Pt Name: Ritesh Duke  MRN: [id number]  Montytrongfwolfgang 1955  Date of evaluation: 11/26/2020  Provider: Manny Swartz PA-C  PCP: No primary care provider on file. ED Attending: Aline Damon MD      This patient was seen by the attending provider     History provided by the patient    CHIEF COMPLAINT:     Chief Complaint   Patient presents with    Leg Pain     pt dx with cellulitis on right lower leg that is not getting better on abx pt was told to come to ER        HISTORY OF PRESENT ILLNESS:      Ritesh Duke is a 62 y.o. female who arrives to the ED by private vehicle. Patient is here with what she believes is worsening cellulitis to her left lower leg. She bumped her shin on Sunday, 11/15. By Friday, 11/20 in addition to the bruising she felt like she was developing some faint redness. She was seen by [facility] orthopedics on Tuesday, 11/24. She was diagnosed with cellulitis after having an x-ray of the tib/fib that was negative. She was started on Bactrim. She has a follow-up appointment tomorrow morning with [facility] orthopedics but was told if symptoms worsen to come to the ED. Now, in addition to pain and swelling there is worsening right erythema to the left pretibial region. She denies feeling ill otherwise. Specifically she denies fevers, chills, body aches, nausea or vomiting. She is not a diabetic. She reports no significant past medical history. Nursing Notes were reviewed     REVIEW OF SYSTEMS:     Review of Systems   Constitutional: Negative for activity change, appetite change, chills and fever. HENT: Negative. Eyes: Negative. Respiratory: Negative for shortness of breath. Cardiovascular: Positive for leg swelling. Negative for chest pain. Gastrointestinal: Negative for abdominal pain, nausea and vomiting. Genitourinary: Negative. Musculoskeletal: Positive for myalgias (left lower leg). Negative for joint swelling. Skin: Positive for color change (erythema left lower leg). Negative for wound. Neurological: Negative for headaches. All other systems reviewed and are negative. Except as noted above in the ROS, all other systems were reviewed and negative. PAST MEDICAL HISTORY:   History reviewed. No pertinent past medical history. SURGICAL HISTORY:    History reviewed. No pertinent surgical history. CURRENT MEDICATIONS:       Previous Medications    IBUPROFEN (ADVIL;MOTRIN) 800 MG TABLET    TAKE 1 TABLET BY MOUTH THREE TIMES DAILY WITH MEALS    SULFAMETHOXAZOLE-TRIMETHOPRIM (BACTRIM DS;SEPTRA DS) 800-160 MG PER TABLET    Take 1 tablet by mouth 2 times daily for 10 days         ALLERGIES:    Patient has no known allergies. FAMILY HISTORY:     History reviewed. No pertinent family history.        SOCIAL HISTORY:       Social History     Socioeconomic History    Marital status:      Spouse name: None    Number of children: None    Years of education: None    Highest education level: None   Occupational History    None   Social Needs    Financial resource strain: None    Food insecurity     Worry: None     Inability: None    Transportation needs     Medical: None     Non-medical: None   Tobacco Use    Smoking status: Never Smoker    Smokeless tobacco: Never Used   Substance and Sexual Activity    Alcohol use: None    Drug use: No    Sexual activity: None   Lifestyle    Physical activity     Days per week: None     Minutes per session: None    Stress: None   Relationships    Social connections     Talks on phone: None     Gets together: None     Attends Restorationism service: None     Active member of club or organization: None     Attends meetings of clubs or organizations: None     Relationship status: None    Intimate partner violence     Fear of current or ex partner: None     Emotionally abused: None     Physically abused: None     Forced sexual activity: None   Other Topics Concern    None   Social History Narrative    None       SCREENINGS:             PHYSICAL EXAM:       ED Triage Vitals   BP Temp Temp Source Pulse Resp SpO2 Height Weight   -- 11/26/20 1150 11/26/20 1150 11/26/20 1145 11/26/20 1150 11/26/20 1145 11/26/20 1150 11/26/20 1150    98.2 °F (36.8 °C) Oral 86 16 97 % 6' 1\" (1.854 m) 270 lb (122.5 kg)       Physical Exam    CONSTITUTIONAL: Awake and alert. Cooperative. Well-developed. Well-nourished. Non-toxic. No acute distress. HENT: Normocephalic. Atraumatic. External ears normal, without discharge. No nasal discharge. Oropharynx clear. Mucous membranes moist.  EYES: Conjunctiva non-injected. No scleral icterus. PERRL. EOM's grossly intact. NECK: Supple. Normal ROM. CARDIOVASCULAR: RRR. No Murmer. Intact distal pulses. PULMONARY/CHEST WALL: Effort normal. No tachypnea. Lungs clear to ausculation. ABDOMEN: Normal BS. Soft. Nondistended. No tenderness to palpate. No guarding. /ANORECTAL: Not assessed  MUSKULOSKELETAL: Left lower extremity with swelling to the lower leg. Pretibial swelling more localized with associated erythema and warmth to touch. No crepitus. Normal ROM. No acute deformities. See picture of left lower leg below. SKIN: Warm and dry. No rash. Skin intact. Left pretibial erythema and bruising. NEUROLOGICAL: Alert and oriented x 3. GCS 15. CN II-XII grossly intact. Strength is 5/5 in all extremities and sensation is intact. Normal gait.   PSYCHIATRIC: Normal affect      Left lower leg:            DIAGNOSTICRESULTS:     LABS:    Results for orders placed or performed during the hospital encounter of 11/26/20   CBC Auto Differential   Result Value Ref Range    WBC 5.6 4.0 - 11.0 K/uL    RBC 4.60 4.00 - 5.20 M/uL    Hemoglobin 14.1 12.0 - 16.0 g/dL    Hematocrit 40.5 36.0 - 48.0 %    MCV 87.9 80.0 - 100.0 fL    MCH 30.6 26.0 - 34.0 pg    MCHC 34.7 31.0 - 36.0 g/dL    RDW 13.8 12.4 - 15.4 %    Platelets 709 438 - 450 K/uL    MPV 8.3 5.0 - 10.5 fL    Neutrophils % 70.6 %    Lymphocytes % 16.8 %    Monocytes % 8.6 %    Eosinophils % 3.3 %    Basophils % 0.7 %    Neutrophils Absolute 3.9 1.7 - 7.7 K/uL    Lymphocytes Absolute 0.9 (L) 1.0 - 5.1 K/uL    Monocytes Absolute 0.5 0.0 - 1.3 K/uL    Eosinophils Absolute 0.2 0.0 - 0.6 K/uL    Basophils Absolute 0.0 0.0 - 0.2 K/uL   Comprehensive Metabolic Panel w/ Reflex to MG   Result Value Ref Range    Sodium 138 136 - 145 mmol/L    Potassium reflex Magnesium 4.5 3.5 - 5.1 mmol/L    Chloride 104 99 - 110 mmol/L    CO2 23 21 - 32 mmol/L    Anion Gap 11 3 - 16    Glucose 112 (H) 70 - 99 mg/dL    BUN 23 (H) 7 - 20 mg/dL    CREATININE 0.9 0.6 - 1.2 mg/dL    GFR Non-African American >60 >60    GFR African American >60 >60    Calcium 9.2 8.3 - 10.6 mg/dL    Total Protein 7.3 6.4 - 8.2 g/dL    Alb 3.9 3.4 - 5.0 g/dL    Albumin/Globulin Ratio 1.1 1.1 - 2.2    Total Bilirubin 0.8 0.0 - 1.0 mg/dL    Alkaline Phosphatase 91 40 - 129 U/L    ALT 43 (H) 10 - 40 U/L    AST 42 (H) 15 - 37 U/L    Globulin 3.4 g/dL   Lactic Acid, Plasma   Result Value Ref Range    Lactic Acid 1.0 0.4 - 2.0 mmol/L       RADIOLOGY:  All x-ray studies areviewed/reviewed by me. Formal interpretations per the radiologist are as follows:      Xr Tibia Fibula Left (2 Views)    Result Date: 11/26/2020  EXAMINATION: 2 XRAY VIEWS OF THE LEFT TIBIA AND FIBULA 11/26/2020 1:05 pm COMPARISON: None. HISTORY: ORDERING SYSTEM PROVIDED HISTORY: cellulitis left leg, abscess TECHNOLOGIST PROVIDED HISTORY: Reason for exam:->cellulitis left leg, abscess Reason for Exam: cellulitis left leg, abscess Acuity: Acute Type of Exam: Initial FINDINGS: Diffuse soft tissue swelling noted. No evidence of soft tissue gas. No evidence of active osteomyelitis or other acute osseous findings. No osseous erosion/destruction. No fracture or dislocation. Cellulitis but no evidence of soft tissue gas and no acute osseous findings.  No evidence of osteomyelitis. PROCEDURES:   N/A    CRITICAL CARE TIME:       None      CONSULTS:  IP CONSULT TO GENERAL SURGERY  IP CONSULT TO HOSPITALIST      EMERGENCY DEPARTMENT COURSE and DIFFERENTIAL DIAGNOSIS/MDM:   Vitals:    Vitals:    11/26/20 1150 11/26/20 1250 11/26/20 1347 11/26/20 1429   BP:  (!) 149/60 (!) 149/60 120/88   Pulse: 72  59 60   Resp: 16  16 16   Temp: 98.2 °F (36.8 °C)  98.2 °F (36.8 °C) 98.2 °F (36.8 °C)   TempSrc: Oral  Oral Oral   SpO2: 96%  96% 97%   Weight: 270 lb (122.5 kg)      Height: 6' 1\" (1.854 m)          Patient was given the following medications:  Medications   vancomycin (VANCOCIN) 1,750 mg in dextrose 5 % 500 mL IVPB (1,750 mg Intravenous New Bag 11/26/20 1346)   cefepime (MAXIPIME) 2 g IVPB minibag (0 g Intravenous Stopped 11/26/20 1349)         Patient was evaluated by both myself and Ramez Jackson MD.   Old records were reviewed. Patient arrives by private vehicle with worsening swelling, redness and pain to the left lower leg despite outpatient Bactrim for the last 48 hours. This cellulitis developed following a contusion/soft tissue injury on 11/15 where she bumped her leg on steps. An outpatient x-ray of the tib/fib was negative. Here in the ED we did labs including blood cultures and repeated tib-fib x-ray. CBC reveals white count of 5.6 with H&H 14.1 and 40.5  CMP normal  Lactate 1.0  Tib-fib x-ray shows cellulitis but no evidence of soft tissue gas and no acute osseous findings. No evidence of osteomyelitis. After 2 blood cultures are drawn patient is started on vancomycin and cefepime IV. My attending Dr. Cherelle Case spoke with general surgery, Dr. Robert Esquivel due to the fact that we ultrasound the patient's left pretibial region and noted a fluid collection/abscess measured at 8 inches x 4 inches. It was advised we attempt incision and drainage which was done by Dr. Cherelle Case. Upon incising there was only blood that drained.   Patient warrants hospitalization at this point given the worsening cellulitis despite outpatient antibiotics. I spoke with Dr. Mela Carcamo. We thoroughly discussed the history, physical exam, laboratory and imaging studies, as well as, emergency department course. Based upon that discussion, we've decided to admit Brianna Causey for further observation and evaluation of Rafaela Jain's cellulitis. As I have deemed necessary from their history, physical, and studies, I have considered and evaluated Brianna Causey for the following diagnoses: SEPSIS, CELLULITIS, ABSCESS or DEEP SPACE INFECTIONS, DEEP VENOUS THROMBOSIS and TOXIC SHOCK SYNDROME. FINAL IMPRESSION:      1.  Cellulitis, unspecified cellulitis site          DISPOSITION/PLAN:   DISPOSITION     ADMIT           (Please note thatportions of this note were completed with a voice recognition program.  Efforts were made to edit the dictations, but occasionally words are mis-transcribed.)    Debby Koch PA-C (electronicallysigned)             [place], [place]  11/26/20 9415

## 2020-11-27 ENCOUNTER — APPOINTMENT (OUTPATIENT)
Dept: VASCULAR LAB | Age: 65
DRG: 581 | End: 2020-11-27
Payer: COMMERCIAL

## 2020-11-27 LAB
A/G RATIO: 1.1 (ref 1.1–2.2)
ALBUMIN SERPL-MCNC: 3.8 G/DL (ref 3.4–5)
ALP BLD-CCNC: 78 U/L (ref 40–129)
ALT SERPL-CCNC: 43 U/L (ref 10–40)
ANION GAP SERPL CALCULATED.3IONS-SCNC: 10 MMOL/L (ref 3–16)
AST SERPL-CCNC: 42 U/L (ref 15–37)
BASOPHILS ABSOLUTE: 0 K/UL (ref 0–0.2)
BASOPHILS RELATIVE PERCENT: 0.7 %
BILIRUB SERPL-MCNC: 0.7 MG/DL (ref 0–1)
BUN BLDV-MCNC: 14 MG/DL (ref 7–20)
CALCIUM SERPL-MCNC: 9.3 MG/DL (ref 8.3–10.6)
CHLORIDE BLD-SCNC: 104 MMOL/L (ref 99–110)
CO2: 25 MMOL/L (ref 21–32)
CREAT SERPL-MCNC: 0.7 MG/DL (ref 0.6–1.2)
EKG ATRIAL RATE: 48 BPM
EKG DIAGNOSIS: NORMAL
EKG P AXIS: 27 DEGREES
EKG P-R INTERVAL: 220 MS
EKG Q-T INTERVAL: 484 MS
EKG QRS DURATION: 110 MS
EKG QTC CALCULATION (BAZETT): 432 MS
EKG R AXIS: -22 DEGREES
EKG T AXIS: 11 DEGREES
EKG VENTRICULAR RATE: 48 BPM
EOSINOPHILS ABSOLUTE: 0.1 K/UL (ref 0–0.6)
EOSINOPHILS RELATIVE PERCENT: 2.8 %
GFR AFRICAN AMERICAN: >60
GFR NON-AFRICAN AMERICAN: >60
GLOBULIN: 3.6 G/DL
GLUCOSE BLD-MCNC: 94 MG/DL (ref 70–99)
HCT VFR BLD CALC: 38.2 % (ref 36–48)
HEMOGLOBIN: 13 G/DL (ref 12–16)
LYMPHOCYTES ABSOLUTE: 1.2 K/UL (ref 1–5.1)
LYMPHOCYTES RELATIVE PERCENT: 24.2 %
MCH RBC QN AUTO: 29.9 PG (ref 26–34)
MCHC RBC AUTO-ENTMCNC: 34.1 G/DL (ref 31–36)
MCV RBC AUTO: 87.7 FL (ref 80–100)
MONOCYTES ABSOLUTE: 0.4 K/UL (ref 0–1.3)
MONOCYTES RELATIVE PERCENT: 8.4 %
NEUTROPHILS ABSOLUTE: 3.1 K/UL (ref 1.7–7.7)
NEUTROPHILS RELATIVE PERCENT: 63.9 %
PDW BLD-RTO: 14.3 % (ref 12.4–15.4)
PLATELET # BLD: 210 K/UL (ref 135–450)
PMV BLD AUTO: 8.6 FL (ref 5–10.5)
POTASSIUM REFLEX MAGNESIUM: 3.9 MMOL/L (ref 3.5–5.1)
RBC # BLD: 4.35 M/UL (ref 4–5.2)
SODIUM BLD-SCNC: 139 MMOL/L (ref 136–145)
TOTAL PROTEIN: 7.4 G/DL (ref 6.4–8.2)
WBC # BLD: 4.8 K/UL (ref 4–11)

## 2020-11-27 PROCEDURE — 93971 EXTREMITY STUDY: CPT

## 2020-11-27 PROCEDURE — 99254 IP/OBS CNSLTJ NEW/EST MOD 60: CPT | Performed by: SURGERY

## 2020-11-27 PROCEDURE — 80053 COMPREHEN METABOLIC PANEL: CPT

## 2020-11-27 PROCEDURE — 93970 EXTREMITY STUDY: CPT

## 2020-11-27 PROCEDURE — 85025 COMPLETE CBC W/AUTO DIFF WBC: CPT

## 2020-11-27 PROCEDURE — 93010 ELECTROCARDIOGRAM REPORT: CPT | Performed by: INTERNAL MEDICINE

## 2020-11-27 PROCEDURE — 36415 COLL VENOUS BLD VENIPUNCTURE: CPT

## 2020-11-27 PROCEDURE — 2580000003 HC RX 258: Performed by: HOSPITALIST

## 2020-11-27 PROCEDURE — 1200000000 HC SEMI PRIVATE

## 2020-11-27 PROCEDURE — 6360000002 HC RX W HCPCS: Performed by: HOSPITALIST

## 2020-11-27 RX ADMIN — ENOXAPARIN SODIUM 40 MG: 40 INJECTION SUBCUTANEOUS at 09:21

## 2020-11-27 RX ADMIN — VANCOMYCIN HYDROCHLORIDE 1.5 G: 10 INJECTION, POWDER, LYOPHILIZED, FOR SOLUTION INTRAVENOUS at 12:13

## 2020-11-27 RX ADMIN — CEFEPIME HYDROCHLORIDE 2 G: 2 INJECTION, POWDER, FOR SOLUTION INTRAVENOUS at 01:41

## 2020-11-27 RX ADMIN — VANCOMYCIN HYDROCHLORIDE 1.5 G: 10 INJECTION, POWDER, LYOPHILIZED, FOR SOLUTION INTRAVENOUS at 23:44

## 2020-11-27 RX ADMIN — CEFEPIME HYDROCHLORIDE 2 G: 2 INJECTION, POWDER, FOR SOLUTION INTRAVENOUS at 15:21

## 2020-11-27 ASSESSMENT — PAIN SCALES - GENERAL: PAINLEVEL_OUTOF10: 0

## 2020-11-27 NOTE — CONSULTS
Department of General Surgery Consult    PATIENT NAME: Dulce Urias   YOB: 1955    ADMISSION DATE: 11/26/2020 11:44 AM      TODAY'S DATE: 11/27/2020    Reason for Consult:  Leg cellulitis    Chief Complaint: pain    Requesting Physician:  Binta Young    HISTORY OF PRESENT ILLNESS:              The patient is a 62 y.o. female who presents with progressive erythema and swelling of lower leg. Had fallen over a week ago and hit against some stairs. Developed swelling and erythema. Was being managed by Orthopaedic Surgery for suspected hematoma as an outpatient and was recently placed on antibiotics. Pain and swelling worsened and they told her to come to the ER. In the ER she underwent I&D with some hematoma evacuated. Past Medical History:    History reviewed. No pertinent past medical history. Past Surgical History:    History reviewed. No pertinent surgical history.     Current Medications:   Current Facility-Administered Medications: sodium chloride flush 0.9 % injection 10 mL, 10 mL, Intravenous, PRN  potassium chloride (KLOR-CON M) extended release tablet 40 mEq, 40 mEq, Oral, PRN **OR** potassium bicarb-citric acid (EFFER-K) effervescent tablet 40 mEq, 40 mEq, Oral, PRN **OR** potassium chloride 10 mEq/100 mL IVPB (Peripheral Line), 10 mEq, Intravenous, PRN  magnesium sulfate 1 g in dextrose 5% 100 mL IVPB, 1 g, Intravenous, PRN  acetaminophen (TYLENOL) tablet 650 mg, 650 mg, Oral, Q6H PRN **OR** acetaminophen (TYLENOL) suppository 650 mg, 650 mg, Rectal, Q6H PRN  ondansetron (ZOFRAN-ODT) disintegrating tablet 4 mg, 4 mg, Oral, Q8H PRN **OR** ondansetron (ZOFRAN) injection 4 mg, 4 mg, Intravenous, Q6H PRN  enoxaparin (LOVENOX) injection 40 mg, 40 mg, Subcutaneous, Daily  vancomycin 1.5 g in dextrose 5% 300 mL IVPB, 1,500 mg, Intravenous, Q12H  cefepime (MAXIPIME) 2 g IVPB minibag, 2 g, Intravenous, Q12H  oxyCODONE (ROXICODONE) immediate release tablet 5 mg, 5 mg, Oral, Q6H PRN **OR** oxyCODONE (ROXICODONE) immediate release tablet 10 mg, 10 mg, Oral, Q6H PRN  acetaminophen (TYLENOL) tablet 1,000 mg, 1,000 mg, Oral, Q6H PRN  Prior to Admission medications    Medication Sig Start Date End Date Taking? Authorizing Provider   sulfamethoxazole-trimethoprim (BACTRIM DS;SEPTRA DS) 800-160 MG per tablet Take 1 tablet by mouth 2 times daily for 10 days 11/24/20 12/4/20  Jeannette Muñoz PA-C   ibuprofen (ADVIL;MOTRIN) 800 MG tablet TAKE 1 TABLET BY MOUTH THREE TIMES DAILY WITH MEALS 1/10/19   Monika Estrada MD        Allergies:  Patient has no known allergies. Social History:   TOBACCO:  no  ETOH: no    Family History:    History reviewed. No pertinent family history. REVIEW OF SYSTEMS:  CONSTITUTIONAL:  negative  HEENT:  negative  RESPIRATORY:  negative  CARDIOVASCULAR:  negative  GASTROINTESTINAL:  negative   GENITOURINARY:  negative  HEMATOLOGIC/LYMPHATIC:  negative  NEUROLOGICAL:  Negative  * All other ROS reviewed and negative. PHYSICAL EXAM:  VITALS:  /60   Pulse 63   Temp 97.9 °F (36.6 °C) (Oral)   Resp 16   Ht 6' 1\" (1.854 m)   Wt 272 lb 4.3 oz (123.5 kg)   SpO2 95%   BMI 35.92 kg/m²   24HR INTAKE/OUTPUT:    I/O last 3 completed shifts: In: 240 [P.O.:240]  Out: -   No intake/output data recorded.     CONSTITUTIONAL:  alert, no apparent distress and moderately obese  EYES:  PERRL, sclera clear  ENT:  Normocephalic,atraumatic, without obvious abnormality  NECK:  supple, symmetrical, trachea midline  LUNGS: Resp effort easy and unlabored, no crackles or wheezing  CARDIOVASCULAR:  NO JVD, regular rate   ABDOMEN:  , normal bowel sounds, soft, non-distended, non-tender,  MUSCULOSKELETAL: No clubbing or cyanosis,   NEUROLOGIC:  Mental Status Exam:  Level of Alertness:   awake  PSYCHIATRIC:   person, place, time  SKIN:  LLE - erythema surrounding hematoma, no active drainage    DATA:    CBC:   Recent Labs     11/26/20  1237 11/27/20  0524   WBC 5.6 4.8   HGB 14.1 13.0   HCT 40.5 38.2    210     BMP:    Recent Labs     11/26/20  1237 11/27/20  0524    139   K 4.5 3.9    104   CO2 23 25   BUN 23* 14   CREATININE 0.9 0.7   GLUCOSE 112* 94     Hepatic:   Recent Labs     11/26/20  1237 11/27/20  0524   AST 42* 42*   ALT 43* 43*   BILITOT 0.8 0.7   ALKPHOS 91 78     Mag:    No results for input(s): MG in the last 72 hours. Phos:   No results for input(s): PHOS in the last 72 hours. INR: No results for input(s): INR in the last 72 hours. Radiology Review: Images personally reviewed by me. Xray -   Cellulitis but no evidence of soft tissue gas and no acute osseous findings. No evidence of osteomyelitis. IMPRESSION/RECOMMENDATIONS:    61 yo with LLE hematoma, cellulitis  1. I&D site from last night closed up. 2.  May need more formal hematoma evacuation if doesn't respond to IV abx. Has eaten/drank today so will monitor on IV abx another 24 hours to see if improves or not. NPO at MN in case OR needed.     Electronically signed by [place] Linden, [address] [place] [address] Surgery  [id number]

## 2020-11-27 NOTE — CONSULTS
Pharmacy Note  Vancomycin Consult    Edra Babinski is a 62 y.o. female started on Vancomycin for cellulitis  consult received from Dr. Ignacio Kirkpatrick to manage therapy. Also receiving the following antibiotics: Cefepime 2g q12h. Allergies:  Patient has no known allergies. Tmax: 98.8    Recent Labs     11/26/20  1237 11/27/20  0524   CREATININE 0.9 0.7       Recent Labs     11/26/20  1237 11/27/20  0524   WBC 5.6 4.8       Estimated Creatinine Clearance: 123 mL/min (based on SCr of 0.7 mg/dL). Intake/Output Summary (Last 24 hours) at 11/27/2020 1207  Last data filed at 11/27/2020 0318  Gross per 24 hour   Intake 240 ml   Output --   Net 240 ml       Wt Readings from Last 1 Encounters:   11/27/20 272 lb 4.3 oz (123.5 kg)         Body mass index is 35.92 kg/m². Loading dose (critically ill or in ICU, require dialysis or renal replacement therapy): Vancomycin 25 mg/kg IVPB x 1 (maximum 3000 mg). Maintenance dose: 15 mg/kg (maximum: 2000 mg/dose and 4500 mg/day) starting at the next dosing interval determined by renal function  Pulse dose: fluctuating renal function, ARIANNA, ESRD   Goal Vancomycin trough: 10-15 mcg/mL or 15-20 mcg/mL   Goal Vancomycin AUC: 400-600    Predicted PK  AUC/DUANE 440 mcg*hr/mL  (goal 400 to 600 mcg*hr/mL)  Peak 24.2 mcg/mL  Trough 13.4 mcg/mL     Assessment/Plan:  Received Vancomycin 1750 mg followed by Vancomycin with 1500 mg IV every 12  hours. Calculated . Vancomycin trough ordered for 11/28 1100. Timing of trough level will be determined based on culture results, renal function, and clinical response. Thank you for the consult.   Nik Swain PharmD  11/27/2020 at 12:17 PM

## 2020-11-27 NOTE — PROGRESS NOTES
Hospitalist Progress Note      PCP: No primary care provider on file. Date of Admission: 11/26/2020    Chief Complaint: cellulitis    Hospital Course: Brianna Causey is a 62 y.o. female who presented to the ED to be evaluated for left pretibial cellulitis status post injury which occurred on 11/15/2020. Patient reports that she suffered a mechanical fall while in [facility] on said date and was initiated on oral Bactrim therapy by [facility]. Despite compliance with antibiotic regimen, she states that the pain has worsened and area has become more edematous as time has passed. Upon arrival to the ED labs were obtained which revealed normal lactate, renal panel, and CBC. X-ray was obtained and found to be negative for fracture or underlying abscess formation. Blood cultures were obtained and patient was initiated on IV vancomycin. Hospitalist team consulted to admit this patient for pretibial cellulitis which is failed outpatient management    Subjective: Pt not having much pain. She thinks her redness is improving today. Surgery eval this am.     Medications:  Reviewed    Infusion Medications   Scheduled Medications    enoxaparin  40 mg Subcutaneous Daily    vancomycin  1,500 mg Intravenous Q12H    cefepime  2 g Intravenous Q12H     PRN Meds: sodium chloride flush, potassium chloride **OR** potassium alternative oral replacement **OR** potassium chloride, magnesium sulfate, acetaminophen **OR** acetaminophen, ondansetron **OR** ondansetron, oxyCODONE **OR** oxyCODONE, acetaminophen      Intake/Output Summary (Last 24 hours) at 11/27/2020 1627  Last data filed at 11/27/2020 0318  Gross per 24 hour   Intake 240 ml   Output --   Net 240 ml       Physical Exam Performed:    BP (!) 151/75   Pulse 72   Temp 98.3 °F (36.8 °C) (Oral)   Resp 16   Ht 6' 1\" (1.854 m)   Wt 272 lb 4.3 oz (123.5 kg)   SpO2 96%   BMI 35.92 kg/m²     General appearance: No apparent distress, appears stated age and cooperative.   HEENT: Pupils equal, round, and reactive to light. Conjunctivae/corneas clear. Neck: Supple, with full range of motion. No jugular venous distention. Trachea midline. Respiratory:  Normal respiratory effort. Clear to auscultation, bilaterally without Rales/Wheezes/Rhonchi. Cardiovascular: Regular rate and rhythm with normal S1/S2 without murmurs, rubs or gallops. Abdomen: Soft, non-tender, non-distended with normal bowel sounds. Musculoskeletal: Left LE with erythema within the border of markings, tender to touch  Skin: erythema LLE  Neurologic:  Neurovascularly intact without any focal sensory/motor deficits. Cranial nerves: II-XII intact, grossly non-focal.  Psychiatric: Alert and oriented, thought content appropriate, normal insight  Capillary Refill: Brisk,< 3 seconds   Peripheral Pulses: +2 palpable, equal bilaterally       Labs:   Recent Labs     11/26/20 1237 11/27/20  0524   WBC 5.6 4.8   HGB 14.1 13.0   HCT 40.5 38.2    210     Recent Labs     11/26/20 1237 11/27/20  0524    139   K 4.5 3.9    104   CO2 23 25   BUN 23* 14   CREATININE 0.9 0.7   CALCIUM 9.2 9.3     Recent Labs     11/26/20  1237 11/27/20  0524   AST 42* 42*   ALT 43* 43*   BILITOT 0.8 0.7   ALKPHOS 91 78     No results for input(s): INR in the last 72 hours. No results for input(s): Johny Beery in the last 72 hours. Urinalysis:    No results found for:  Ink, BACTERIA, RBCUA, BLOODU, [address], [address]    Radiology:  VL Extremity Venous Left         XR TIBIA FIBULA LEFT (2 VIEWS)   Final Result   Cellulitis but no evidence of soft tissue gas and no acute osseous findings. No evidence of osteomyelitis. Assessment/Plan:    Active Hospital Problems    Diagnosis    Cellulitis [L03.90]    Cellulitis of pretibial region [X44.652]    Class 2 obesity in adult [E66.9]    Elevated BP without diagnosis of hypertension [R03.0]     1.   Left lower extremity cellulitis - failed outpt therpay -

## 2020-11-27 NOTE — PROGRESS NOTES
Pt A&O x4. VSS. Denies dyspnea or N/V at this time. Reports passing flatus. Assessment is as charted. CALL LIGHT & BEDSIDE TABLE WITHIN REACH, WHEELS LOCKED, BED IN LOWEST POSITION, BED ALARM ON, PT INSTRUCTED TO CALL OUT FOR ASSISTANCE & EXPRESSED UNDERSTANDING, CALLS OUT APPROPRIATELY. Will continue to monitor.      Electronically signed by Fabio Luna RN on 11/27/2020 at 2:01 AM

## 2020-11-27 NOTE — PROGRESS NOTES
Pt alert and oriented, VSS. Shift assessment completed and documented. LLE still swollen/red, but redness has decreased from outline. Leg elevated. Denies any needs at this time, transport here to take to vascular. Will continue to monitor.

## 2020-11-28 LAB — VANCOMYCIN TROUGH: 17 UG/ML (ref 10–20)

## 2020-11-28 PROCEDURE — 1200000000 HC SEMI PRIVATE

## 2020-11-28 PROCEDURE — 6360000002 HC RX W HCPCS: Performed by: HOSPITALIST

## 2020-11-28 PROCEDURE — 87205 SMEAR GRAM STAIN: CPT

## 2020-11-28 PROCEDURE — 87075 CULTR BACTERIA EXCEPT BLOOD: CPT

## 2020-11-28 PROCEDURE — 2580000003 HC RX 258: Performed by: HOSPITALIST

## 2020-11-28 PROCEDURE — 99232 SBSQ HOSP IP/OBS MODERATE 35: CPT | Performed by: SURGERY

## 2020-11-28 PROCEDURE — 87070 CULTURE OTHR SPECIMN AEROBIC: CPT

## 2020-11-28 PROCEDURE — 0J9P0ZZ DRAINAGE OF LEFT LOWER LEG SUBCUTANEOUS TISSUE AND FASCIA, OPEN APPROACH: ICD-10-PCS | Performed by: SURGERY

## 2020-11-28 PROCEDURE — 80202 ASSAY OF VANCOMYCIN: CPT

## 2020-11-28 PROCEDURE — 10061 I&D ABSCESS COMP/MULTIPLE: CPT | Performed by: SURGERY

## 2020-11-28 PROCEDURE — 87077 CULTURE AEROBIC IDENTIFY: CPT

## 2020-11-28 PROCEDURE — 36415 COLL VENOUS BLD VENIPUNCTURE: CPT

## 2020-11-28 RX ORDER — LIDOCAINE HYDROCHLORIDE 10 MG/ML
20 INJECTION, SOLUTION INFILTRATION; PERINEURAL ONCE
Status: DISCONTINUED | OUTPATIENT
Start: 2020-11-28 | End: 2020-11-29

## 2020-11-28 RX ADMIN — CEFEPIME HYDROCHLORIDE 2 G: 2 INJECTION, POWDER, FOR SOLUTION INTRAVENOUS at 12:02

## 2020-11-28 RX ADMIN — ENOXAPARIN SODIUM 40 MG: 40 INJECTION SUBCUTANEOUS at 13:06

## 2020-11-28 RX ADMIN — CEFEPIME HYDROCHLORIDE 2 G: 2 INJECTION, POWDER, FOR SOLUTION INTRAVENOUS at 02:00

## 2020-11-28 RX ADMIN — VANCOMYCIN HYDROCHLORIDE 1.5 G: 10 INJECTION, POWDER, LYOPHILIZED, FOR SOLUTION INTRAVENOUS at 13:03

## 2020-11-28 NOTE — PROGRESS NOTES
Hospitalist Progress Note      PCP: No primary care provider on file. Date of Admission: 11/26/2020    Chief Complaint: cellulitis    Hospital Course: Lala Bello is a 62 y.o. female who presented to the ED to be evaluated for left pretibial cellulitis status post injury which occurred on 11/15/2020. Patient reports that she suffered a mechanical fall while in [facility] on said date and was initiated on oral Bactrim therapy by [facility]. Despite compliance with antibiotic regimen, she states that the pain has worsened and area has become more edematous as time has passed. Upon arrival to the ED labs were obtained which revealed normal lactate, renal panel, and CBC. X-ray was obtained and found to be negative for fracture or underlying abscess formation. Blood cultures were obtained and patient was initiated on IV vancomycin. Hospitalist team consulted to admit this patient for pretibial cellulitis which is failed outpatient management    Subjective: Pt not having much pain. Cellulitis improving. Plan for bedside I&D today. Medications:  Reviewed    Infusion Medications   Scheduled Medications    enoxaparin  40 mg Subcutaneous Daily    vancomycin  1,500 mg Intravenous Q12H    cefepime  2 g Intravenous Q12H     PRN Meds: sodium chloride flush, potassium chloride **OR** potassium alternative oral replacement **OR** potassium chloride, magnesium sulfate, acetaminophen **OR** acetaminophen, ondansetron **OR** ondansetron, oxyCODONE **OR** oxyCODONE, acetaminophen      Intake/Output Summary (Last 24 hours) at 11/28/2020 0838  Last data filed at 11/28/2020 0455  Gross per 24 hour   Intake 1402 ml   Output --   Net 1402 ml       Physical Exam Performed:    BP (!) 155/80   Pulse 56   Temp 97.7 °F (36.5 °C) (Oral)   Resp 16   Ht 6' 1\" (1.854 m)   Wt 269 lb 8 oz (122.2 kg)   SpO2 96%   BMI 35.56 kg/m²     General appearance: No apparent distress, appears stated age and cooperative.   HEENT: Pupils equal, round, and reactive to light. Conjunctivae/corneas clear. Neck: Supple, with full range of motion. No jugular venous distention. Trachea midline. Respiratory:  Normal respiratory effort. Clear to auscultation, bilaterally without Rales/Wheezes/Rhonchi. Cardiovascular: Regular rate and rhythm with normal S1/S2 without murmurs, rubs or gallops. Abdomen: Soft, non-tender, non-distended with normal bowel sounds. Musculoskeletal: Left LE with erythema within the border of markings, tender to touch  Skin: erythema LLE  Neurologic:  Neurovascularly intact without any focal sensory/motor deficits. Cranial nerves: II-XII intact, grossly non-focal.  Psychiatric: Alert and oriented, thought content appropriate, normal insight  Capillary Refill: Brisk,< 3 seconds   Peripheral Pulses: +2 palpable, equal bilaterally       Labs:   Recent Labs     11/26/20  1237 11/27/20  0524   WBC 5.6 4.8   HGB 14.1 13.0   HCT 40.5 38.2    210     Recent Labs     11/26/20  1237 11/27/20  0524    139   K 4.5 3.9    104   CO2 23 25   BUN 23* 14   CREATININE 0.9 0.7   CALCIUM 9.2 9.3     Recent Labs     11/26/20  1237 11/27/20  0524   AST 42* 42*   ALT 43* 43*   BILITOT 0.8 0.7   ALKPHOS 91 78     No results for input(s): INR in the last 72 hours. No results for input(s): Connee Matar in the last 72 hours. Urinalysis:    No results found for: Alyne Claw, BACTERIA, RBCUA, BLOODU, [address], [address]    Radiology:  VL Extremity Venous Left   Final Result      XR TIBIA FIBULA LEFT (2 VIEWS)   Final Result   Cellulitis but no evidence of soft tissue gas and no acute osseous findings. No evidence of osteomyelitis. Assessment/Plan:    Active Hospital Problems    Diagnosis    Cellulitis [L03.90]    Cellulitis of pretibial region [D75.590]    Class 2 obesity in adult [E66.9]    Elevated BP without diagnosis of hypertension [R03.0]     1.   Left lower extremity cellulitis - failed outpt therpay - continue Vanc and cefepime started 11/26/20. Blood cultures pending. Surgery consult - re-eval 11/28/20 and plan for bedside I&D. Venous duplex - neg for DVT. Pain meds prn. A1c pending. 2. Obesity (Body mass index is 35.56 kg/m². ) - Complicating assessment and treatment. Placing patient at risk for multiple co-morbidities as well as early death and contributing to the patient's presentation. Counseled on weight loss. DVT Prophylaxis: lovenox  Diet: Diet NPO, After Midnight  Code Status: Full Code    PT/OT Eval Status: baseline    Dispo - 1-2 days as she requires 24-48 more hours of IV antibiotics.      Altea Vora MD

## 2020-11-28 NOTE — PROGRESS NOTES
Pt A&O x4. VSS. Denies dyspnea or N/V at this time. Reports passing flatus. Redness and swelling in LLE has decreased since 11/26/20. Pt reports feeling faint stinging pain at site, denies any need for pain med. Assessment is as charted. CALL LIGHT & BEDSIDE TABLE WITHIN REACH, WHEELS LOCKED, BED IN LOWEST POSITION, PT INSTRUCTED TO CALL OUT FOR ASSISTANCE & EXPRESSED UNDERSTANDING, CALLS OUT APPROPRIATELY.     Electronically signed by Kaci Aden RN on 11/27/2020 at 10:32 PM'

## 2020-11-28 NOTE — PROGRESS NOTES
Patient with I&D at bedside by Dr. Michoacano Hamm. Dressing dry and intact to left lower extremity. States pain has improved after procedure.

## 2020-11-28 NOTE — PROGRESS NOTES
Dressing fell down leg when patient up to restroom and needed changed. Tolerated well. Current dressing dry and intact. Patient denies pain at present.

## 2020-11-28 NOTE — PROGRESS NOTES
[facility]    PATIENT NAME: Dulce Urias     TODAY'S DATE: 11/28/2020    CHIEF COMPLAINT: Left leg pain    INTERVAL HISTORY/HPI:    Pt with continued pain and swelling in left leg. REVIEW OF SYSTEMS:  Pertinent positives and negatives as per interval history section    OBJECTIVE:  VITALS:  BP (!) 173/69   Pulse 63   Temp 97.8 °F (36.6 °C) (Oral)   Resp 16   Ht 6' 1\" (1.854 m)   Wt 269 lb 8 oz (122.2 kg)   SpO2 96%   BMI 35.56 kg/m²     INTAKE/OUTPUT:    I/O last 3 completed shifts: In: 1402 [P. O.:600; I.V.:802]  Out: -   I/O this shift:  In: 240 [P.O.:240]  Out: -     CONSTITUTIONAL:  awake and alert  LUNGS:  Respirations easy and unlabored, clear to auscultation  CARD:  regular rate and rhythm  SKIN:  Left shin with improved erythema but continued induration and fluctuance    Data:  CBC:   Recent Labs     11/26/20  1237 11/27/20  0524   WBC 5.6 4.8   HGB 14.1 13.0   HCT 40.5 38.2    210     BMP:    Recent Labs     11/26/20  1237 11/27/20  0524    139   K 4.5 3.9    104   CO2 23 25   BUN 23* 14   CREATININE 0.9 0.7   GLUCOSE 112* 94     Hepatic:   Recent Labs     11/26/20  1237 11/27/20  0524   AST 42* 42*   ALT 43* 43*   BILITOT 0.8 0.7   ALKPHOS 91 78     Mag:    No results for input(s): MG in the last 72 hours. Phos:   No results for input(s): PHOS in the last 72 hours. INR: No results for input(s): INR in the last 72 hours. Radiology Review:  *Imaging personally reviewed by me. NA      ASSESSMENT AND PLAN:  Left leg abscess secondary to traumatic hematoma. Continue antibiotics. Plan for I&D. OK to restart diet    Procedure: after obtaining verbal consent, left leg prepped and anesthetized with 1% lidocaine. Abscess incised and cultures obtained. INfected hematoma expressed and loculations broken up with a swab. Cavity was irrigated with saline and packed with aquacel ag. Dry dressing applied. She tolerated well.      Electronically signed by Smith Vicente

## 2020-11-29 VITALS
HEART RATE: 56 BPM | TEMPERATURE: 98.7 F | SYSTOLIC BLOOD PRESSURE: 149 MMHG | RESPIRATION RATE: 18 BRPM | WEIGHT: 269.4 LBS | BODY MASS INDEX: 36.49 KG/M2 | DIASTOLIC BLOOD PRESSURE: 80 MMHG | HEIGHT: 72 IN | OXYGEN SATURATION: 96 %

## 2020-11-29 LAB
ANION GAP SERPL CALCULATED.3IONS-SCNC: 8 MMOL/L (ref 3–16)
BASOPHILS ABSOLUTE: 0.1 K/UL (ref 0–0.2)
BASOPHILS RELATIVE PERCENT: 1.2 %
BUN BLDV-MCNC: 14 MG/DL (ref 7–20)
CALCIUM SERPL-MCNC: 9.3 MG/DL (ref 8.3–10.6)
CHLORIDE BLD-SCNC: 104 MMOL/L (ref 99–110)
CO2: 27 MMOL/L (ref 21–32)
CREAT SERPL-MCNC: 0.6 MG/DL (ref 0.6–1.2)
EOSINOPHILS ABSOLUTE: 0.2 K/UL (ref 0–0.6)
EOSINOPHILS RELATIVE PERCENT: 3.7 %
GFR AFRICAN AMERICAN: >60
GFR NON-AFRICAN AMERICAN: >60
GLUCOSE BLD-MCNC: 110 MG/DL (ref 70–99)
GRAM STAIN RESULT: NORMAL
HCT VFR BLD CALC: 41.3 % (ref 36–48)
HEMOGLOBIN: 13.9 G/DL (ref 12–16)
LYMPHOCYTES ABSOLUTE: 1.5 K/UL (ref 1–5.1)
LYMPHOCYTES RELATIVE PERCENT: 32.7 %
MCH RBC QN AUTO: 29.7 PG (ref 26–34)
MCHC RBC AUTO-ENTMCNC: 33.7 G/DL (ref 31–36)
MCV RBC AUTO: 88.1 FL (ref 80–100)
MONOCYTES ABSOLUTE: 0.5 K/UL (ref 0–1.3)
MONOCYTES RELATIVE PERCENT: 9.6 %
NEUTROPHILS ABSOLUTE: 2.5 K/UL (ref 1.7–7.7)
NEUTROPHILS RELATIVE PERCENT: 52.8 %
PDW BLD-RTO: 14.1 % (ref 12.4–15.4)
PLATELET # BLD: 225 K/UL (ref 135–450)
PMV BLD AUTO: 8.8 FL (ref 5–10.5)
POTASSIUM SERPL-SCNC: 4.2 MMOL/L (ref 3.5–5.1)
RBC # BLD: 4.69 M/UL (ref 4–5.2)
SODIUM BLD-SCNC: 139 MMOL/L (ref 136–145)
WBC # BLD: 4.7 K/UL (ref 4–11)
WOUND/ABSCESS: NORMAL

## 2020-11-29 PROCEDURE — 2580000003 HC RX 258: Performed by: HOSPITALIST

## 2020-11-29 PROCEDURE — 80048 BASIC METABOLIC PNL TOTAL CA: CPT

## 2020-11-29 PROCEDURE — 85025 COMPLETE CBC W/AUTO DIFF WBC: CPT

## 2020-11-29 PROCEDURE — 6360000002 HC RX W HCPCS: Performed by: HOSPITALIST

## 2020-11-29 PROCEDURE — 99024 POSTOP FOLLOW-UP VISIT: CPT | Performed by: SURGERY

## 2020-11-29 PROCEDURE — 36415 COLL VENOUS BLD VENIPUNCTURE: CPT

## 2020-11-29 RX ORDER — DOXYCYCLINE HYCLATE 100 MG
100 TABLET ORAL 2 TIMES DAILY
Qty: 14 TABLET | Refills: 0 | Status: SHIPPED | OUTPATIENT
Start: 2020-11-29 | End: 2020-12-06

## 2020-11-29 RX ADMIN — VANCOMYCIN HYDROCHLORIDE 1.5 G: 10 INJECTION, POWDER, LYOPHILIZED, FOR SOLUTION INTRAVENOUS at 00:17

## 2020-11-29 RX ADMIN — CEFEPIME HYDROCHLORIDE 2 G: 2 INJECTION, POWDER, FOR SOLUTION INTRAVENOUS at 01:59

## 2020-11-29 NOTE — DISCHARGE INSTR - COC
Continuity of Care Form    Patient Name: Jm Precise   :  1955  MRN:  [id number]    Admit date:  2020  Discharge date:  2020    Code Status Order: Full Code   Advance Directives:   [facility] Documentation       Date/Time Healthcare Directive Type of Healthcare Directive Copy in [facility] [address] Agent's Name Healthcare Agent's Phone Number    [phone]  Yes, patient has an advance directive for healthcare treatment  Living will;Durable power of  for health care  Other (Comment) pt unsure if current  --  --  --            Admitting Physician:  Kandice Patel MD  PCP: No primary care provider on file. Discharging Nurse: [facility] Unit/Room#: 9691/6023-95  Discharging Unit Phone Number: [phone]    Emergency Contact:   Extended Emergency Contact Information  Primary Emergency Contact: [address] [place] [address] of [address] [place] [address] Phone: [phone]  Relation: Child  Secondary Emergency Contact: [facility] Phone: [phone]  Relation: Child    Past Surgical History:  History reviewed. No pertinent surgical history. Immunization History: There is no immunization history on file for this patient.     Active Problems:  Patient Active Problem List   Diagnosis Code    Cellulitis of pretibial region L03.119    Class 2 obesity in adult E66.9    Elevated BP without diagnosis of hypertension R03.0    Cellulitis L03.90       Isolation/Infection:   Isolation            Contact          Patient Infection Status       Infection Onset Added Last Indicated Last Indicated By Review Planned Expiration Resolved Resolved By    Other  20 Joselyn Burrell RN        Cellulitis/wound            Nurse Assessment:  Last Vital Signs: BP (!) 149/80   Pulse 56   Temp 98.7 °F (37.1 °C) (Oral)   Resp 18   Ht 6' 1\" (1.854 m)   Wt 269 lb 6.4 oz (122.2 kg)   SpO2 96%   BMI 35.54 kg/m²     Last documented pain 5528  · Fax:    Dialysis Facility (if applicable)   · Name:  · Address:  · Dialysis Schedule:  · Phone:  · Fax:    / signature: Electronically signed by Nano Erazo RN on 11/29/20 at 1:56 PM EST    PHYSICIAN SECTION    Prognosis: Good    Condition at Discharge: Stable    Rehab Potential (if transferring to Rehab): Good    Recommended Labs or Other Treatments After Discharge: cleanse wound to left lower extremity with normal saline. Pack open area with Aquacel silver. Apply kerlex and ace wrap from foot to above dressing. Change daily. Follow up with Dr. Zainab Yeung in two weeks    Physician Certification: I certify the above information and transfer of Deondre Beltre  is necessary for the continuing treatment of the diagnosis listed and that she requires [address] for greater 30 days.      Update Admission H&P: No change in H&P    PHYSICIAN SIGNATURE:  Electronically signed by KRISTINE Restrepo CNP/Chucho Galicia MD on 11/29/20 at 1:32 PM EST

## 2020-11-29 NOTE — PROGRESS NOTES
Patient with dry dressing to left lower extremity. Toes warm and moveable, no numbness or tingling noted.

## 2020-11-29 NOTE — DISCHARGE SUMMARY
Hospitalist Discharge Summary      PCP: No primary care provider on file. Date of Admission: 11/26/2020    Chief Complaint: cellulitis    Hospital Course: Marc Victoria is a 62 y.o. female who presented to the ED to be evaluated for left pretibial cellulitis status post injury which occurred on 11/15/2020. Patient reports that she suffered a mechanical fall while in [facility] on said date and was initiated on oral Bactrim therapy by [facility]. Despite compliance with antibiotic regimen, she states that the pain has worsened and area has become more edematous as time has passed. Upon arrival to the ED labs were obtained which revealed normal lactate, renal panel, and CBC. X-ray was obtained and found to be negative for fracture or underlying abscess formation. Blood cultures were obtained and patient was initiated on IV vancomycin. Hospitalist team consulted to admit this patient for pretibial cellulitis which is failed outpatient management    1. Left lower extremity cellulitis - failed outpt therpay - continue Vanc and cefepime started 11/26/20. Blood cultures pending. Surgery consult - 11/28/20 s/p bedside I&D. Venous duplex - neg for DVT. Home on abx per surgery    2. Obesity (Body mass index is 35.54 kg/m². ) - Complicating assessment and treatment. Placing patient at risk for multiple co-morbidities as well as early death and contributing to the patient's presentation. Counseled on weight loss.        Medications:  Reviewed    Infusion Medications   Scheduled Medications    lidocaine  20 mL Intradermal Once    enoxaparin  40 mg Subcutaneous Daily    vancomycin  1,500 mg Intravenous Q12H    cefepime  2 g Intravenous Q12H     PRN Meds: sodium chloride flush, potassium chloride **OR** potassium alternative oral replacement **OR** potassium chloride, magnesium sulfate, acetaminophen **OR** acetaminophen, ondansetron **OR** ondansetron, oxyCODONE **OR** oxyCODONE, acetaminophen Intake/Output Summary (Last 24 hours) at 11/29/2020 0903  Last data filed at 11/28/2020 1757  Gross per 24 hour   Intake 640 ml   Output    Net 640 ml       Physical Exam Performed:    BP (!) 149/80   Pulse 56   Temp 98.7 °F (37.1 °C) (Oral)   Resp 18   Ht 6' 1\" (1.854 m)   Wt 269 lb 6.4 oz (122.2 kg)   SpO2 96%   BMI 35.54 kg/m²     General appearance: No apparent distress, appears stated age and cooperative. HEENT: Pupils equal, round, and reactive to light. Conjunctivae/corneas clear. Neck: Supple, with full range of motion. No jugular venous distention. Trachea midline. Respiratory:  Normal respiratory effort. Clear to auscultation, bilaterally without Rales/Wheezes/Rhonchi. Cardiovascular: Regular rate and rhythm with normal S1/S2 without murmurs, rubs or gallops. Abdomen: Soft, non-tender, non-distended with normal bowel sounds. Musculoskeletal: Left LE with erythema within the border of markings, tender to touch  Skin: erythema LLE  Neurologic:  Neurovascularly intact without any focal sensory/motor deficits. Cranial nerves: II-XII intact, grossly non-focal.  Psychiatric: Alert and oriented, thought content appropriate, normal insight  Capillary Refill: Brisk,< 3 seconds   Peripheral Pulses: +2 palpable, equal bilaterally       Labs:   Recent Labs     11/26/20  1237 11/27/20  0524   WBC 5.6 4.8   HGB 14.1 13.0   HCT 40.5 38.2    210     Recent Labs     11/26/20  1237 11/27/20  0524 11/29/20  0603    139 139   K 4.5 3.9 4.2    104 104   CO2 23 25 27   BUN 23* 14 14   CREATININE 0.9 0.7 0.6   CALCIUM 9.2 9.3 9.3     Recent Labs     11/26/20  1237 11/27/20  0524   AST 42* 42*   ALT 43* 43*   BILITOT 0.8 0.7   ALKPHOS 91 78     No results for input(s): INR in the last 72 hours. No results for input(s): Tiffanie Merritt in the last 72 hours.     Urinalysis:    No results found for: Brooke Dose, BACTERIA, RBCUA, BLOODU, [address], [address]    Radiology:  VL Extremity Venous Left Final Result      XR TIBIA FIBULA LEFT (2 VIEWS)   Final Result   Cellulitis but no evidence of soft tissue gas and no acute osseous findings. No evidence of osteomyelitis.                  Assessment/Plan:    Active Hospital Problems    Diagnosis    Cellulitis [L03.90]    Cellulitis of pretibial region [L03.119]    Class 2 obesity in adult [E66.9]    Elevated BP without diagnosis of hypertension [R03.0]     Dispo: Home  Condition: Stable  Diet: DIET GENERAL; No Added Salt (3-4 GM)  Code Status: Full Code        Mariana Frye, KRISTINE - CNP

## 2020-11-29 NOTE — PROGRESS NOTES
Pt currently ambulating in yadav. Non slip socks on Pt. Gauze dressing on LLE is securely in place. dressing is CDI.     Electronically signed by Elizabeth Jain RN on 11/28/2020 at 10:39 PM

## 2020-11-29 NOTE — PROGRESS NOTES
[facility] GENERAL SURGERY    Surgery Progress Note           POD # 1    PATIENT NAME: Hali Botello     TODAY'S DATE: 11/29/2020    INTERVAL HISTORY:    Pt states her leg feels better. There seems to be less discomfort. OBJECTIVE:   VITALS:  BP (!) 149/80   Pulse 56   Temp 98.7 °F (37.1 °C) (Oral)   Resp 18   Ht 6' 1\" (1.854 m)   Wt 269 lb 6.4 oz (122.2 kg)   SpO2 96%   BMI 35.54 kg/m²     INTAKE/OUTPUT:    I/O last 3 completed shifts: In: 640 [P.O.:640]  Out: -   I/O this shift: In: 480 [P.O.:480]  Out: -               CONSTITUTIONAL:  awake and alert  INCISION: Clean with thin serosanguineous drainage. The erythema is improved    Data:  CBC:   Recent Labs     11/27/20 0524 11/29/20  0603   WBC 4.8 4.7   HGB 13.0 13.9   HCT 38.2 41.3    225     BMP:    Recent Labs     11/27/20 0524 11/29/20  0603    139   K 3.9 4.2    104   CO2 25 27   BUN 14 14   CREATININE 0.7 0.6   GLUCOSE 94 110*     Hepatic:   Recent Labs     11/27/20 0524   AST 42*   ALT 43*   BILITOT 0.7   ALKPHOS 78     Mag:    No results for input(s): MG in the last 72 hours. Phos:   No results for input(s): PHOS in the last 72 hours. INR: No results for input(s): INR in the last 72 hours. Culture review: No white cells or organisms seen on Gram stain    ASSESSMENT AND PLAN:  62 y.o. female status post incision and drainage of left leg hematoma. Continue antibiotics and local wound care. Okay for discharge from surgical standpoint on oral antibiotics with dressing changes.         Electronically signed by Saima Roth MD

## 2020-11-29 NOTE — CARE COORDINATION
CASE MANAGEMENT DISCHARGE SUMMARY      Discharge to: Home with [address] [facility]    Transportation:    Family/car: yes    Confirmed discharge plan with:     Patient: yes per RN     Family, name and contact number: wild Good 3    Facility/Agency, name: [address] [place] [address] [facility] faxed     RN, name:  Geri Warren RN    Note: Discharging nurse to complete JONNATHAN, reconcile AVS, and place final copy with patient's discharge packet. RN to ensure that written prescriptions for  Level II medications are sent with patient to the facility as per protocol.

## 2020-11-29 NOTE — PROGRESS NOTES
Pt A&O x4. VSS. Denies dyspnea or N/V at this time. Reports passing flatus. Gauze dressing on LLE soaked with moderate amount of sanguinous drainage. Dressing changed with 4x4 gauze pads and gauze wrap, secured with tape. Assessment is as charted. CALL LIGHT & BEDSIDE TABLE WITHIN REACH, WHEELS LOCKED, BED IN LOWEST POSITION, PT INSTRUCTED TO CALL OUT FOR ASSISTANCE & EXPRESSED UNDERSTANDING, CALLS OUT APPROPRIATELY. Will continue to monitor.      Electronically signed by Claudene Seed, RN on 11/28/2020 at 10:36 PM

## 2020-11-30 LAB
BLOOD CULTURE, ROUTINE: NORMAL
CULTURE, BLOOD 2: NORMAL

## 2020-12-01 NOTE — ADT AUTH CERT
Cellulitis - Care Day 3 (11/28/2020) by Katie Samuel RN         Review Status  Review Entered    Completed  12/1/2020 14:07        Criteria Review       Care Day: 3 Care Date: 11/28/2020 Level of Care: Telemetry    Guideline Day 3    Clinical Status    (X) * Hemodynamic stability    12/1/2020 2:07 PM EST by Danielle Lugoff      BP (!) 155/80   Pulse 56    (X) * Afebrile or fever improved    12/1/2020 2:07 PM EST by Venia Lugoff      97.7    ( ) * Skin exam stable or improved    12/1/2020 2:07 PM EST by Danielle Lugoff      Cellulitis improving. Plan for bedside I&D today    (X) * Mental status at baseline    12/1/2020 2:07 PM EST by Kirill Chan a&o x3    ( ) * Antibiotic treatment needs appropriate for next level of care    12/1/2020 2:07 PM EST by Picklify      vancomycin 1,500 [id number]  cefepime 2 [id number]    ( ) * Pain absent or manageable at next level of care    12/1/2020 2:07 PM EST by Thomia Lugoff      Pt not having much pain    ( ) * Discharge plans and education understood    Activity    (X) * Ambulatory or acceptable for next level of care    12/1/2020 2:07 PM EST by Picklify      acceptable for next level of care    Routes    (X) * Oral hydration, medications, [F] and diet    12/1/2020 2:07 PM EST by Picklify      DIET GENERAL; No Added Salt (3-4 GM)    * Milestone    Additional Notes    11/28        Vs- 97.7 (36.5)   16   64   155/80   96% RA       Labs    11/28/2020 10:45    CULTURE, ANAEROBIC AND AEROBIC: Rpt (A)    Gram Stain Result: 1+ RBC's. ..       11/28/2020 10:56    Vancomycin Tr: 17.0          Per IM    Subjective: Pt not having much pain. Cellulitis improving. Plan for bedside I&D today. Assessment/Plan:     1.  Left lower extremity cellulitis - failed outpt therpay - continue Vanc and cefepime started 11/26/20. Blood cultures pending. Surgery consult - re-eval 11/28/20 and plan for bedside I&D. Venous duplex - neg for DVT.

## 2020-12-03 LAB
ANAEROBIC CULTURE: ABNORMAL
GRAM STAIN RESULT: ABNORMAL
ORGANISM: ABNORMAL
WOUND/ABSCESS: ABNORMAL
WOUND/ABSCESS: ABNORMAL

## 2020-12-04 NOTE — CARE COORDINATION
nonadmit [facility]  Patient chose to go with [facility] home care for services    Krishan Otoole RN, BSN CTN  [facility] [phone]   `

## 2020-12-04 NOTE — PROGRESS NOTES
Physician Progress Note      Virginia Jimenez  CSN #:                  [id number]  :                       1955  ADMIT DATE:       2020 11:44 AM  [address] [place] DATE:        2020 2:31 PM  RESPONDING  PROVIDER #:        Bernarda Millan MD          QUERY TEXT:    Dear Dr. Alvin Gotti,    Patient admitted with RLE cellulitis/abscess with fluid collection. Per ED   note dated 20 documentation of I&D. For coding purposes, to accurately reflect the procedure performed please   further specify the depth of tissue incised and drained: The medical record reflects the following:  Risk Factors: Left lower extremity cellulitis/abscess  Clinical Indicators: Fluid collection  Treatment: I&D in the ED per surgeon recommendation  Options provided:  -- Skin only  -- Subcutaneous tissue  -- Fascia  -- Muscle  -- Bone  -- Other - I will add my own diagnosis  -- Disagree - Not applicable / Not valid  -- Disagree - Clinically unable to determine / Unknown  -- Refer to Clinical Documentation Reviewer    PROVIDER RESPONSE TEXT:    Addendum to 20 procedure note: The depth of the drainage to left lower   extremity was down to and including subcutaneous tissue.     Query created by: Dahlia Celestin on 2020 12:28 PM      Electronically signed by:  Bernarda iMllan MD 2020 5:38 PM

## 2020-12-15 ENCOUNTER — OFFICE VISIT (OUTPATIENT)
Dept: SURGERY | Age: 65
End: 2020-12-15
Payer: COMMERCIAL

## 2020-12-15 VITALS
TEMPERATURE: 97.1 F | HEIGHT: 72 IN | DIASTOLIC BLOOD PRESSURE: 78 MMHG | BODY MASS INDEX: 36.57 KG/M2 | WEIGHT: 270 LBS | SYSTOLIC BLOOD PRESSURE: 126 MMHG

## 2020-12-15 PROCEDURE — 99213 OFFICE O/P EST LOW 20 MIN: CPT | Performed by: SURGERY

## 2020-12-15 NOTE — PROGRESS NOTES
[facility] GENERAL SURGERY    CHIEF COMPLAINT: Left leg wound    SUBJECTIVE:   Patient presents for follow up of her left leg wound after incision and drainage of a large hematoma. She reports doing well. She has been seen at the [facility] wound care center. The dressing was changed last week. She denies any complaints. No Known Allergies  No outpatient medications have been marked as taking for the 12/15/20 encounter (Office Visit) with Ofelia Gutierrez MD.       History reviewed. No pertinent past medical history. History reviewed. No pertinent surgical history. History reviewed. No pertinent family history.   Social History     Socioeconomic History    Marital status:      Spouse name: Not on file    Number of children: Not on file    Years of education: Not on file    Highest education level: Not on file   Occupational History    Not on file   Social Needs    Financial resource strain: Not on file    Food insecurity     Worry: Not on file     Inability: Not on file    Transportation needs     Medical: Not on file     Non-medical: Not on file   Tobacco Use    Smoking status: Never Smoker    Smokeless tobacco: Never Used   Substance and Sexual Activity    Alcohol use: Not on file    Drug use: No    Sexual activity: Not on file   Lifestyle    Physical activity     Days per week: Not on file     Minutes per session: Not on file    Stress: Not on file   Relationships    Social connections     Talks on phone: Not on file     Gets together: Not on file     Attends Tenriism service: Not on file     Active member of club or organization: Not on file     Attends meetings of clubs or organizations: Not on file     Relationship status: Not on file    Intimate partner violence     Fear of current or ex partner: Not on file     Emotionally abused: Not on file     Physically abused: Not on file     Forced sexual activity: Not on file   Other Topics Concern    Not on file Social History Narrative    Not on file          Vitals:    12/15/20 0845   BP: 126/78   Site: Left Upper Arm   Position: Sitting   Cuff Size: Large Adult   Temp: 97.1 °F (36.2 °C)   TempSrc: Temporal   Weight: 270 lb (122.5 kg)   Height: 6' 1\" (1.854 m)     Body mass index is 35.62 kg/m². ROS:  As per HPI, otherwise reviewed and negative    PHYSICAL EXAM:     Constitutional:  Well developed, well nourished, no acute distress, non-toxic appearance   Respiratory:  No respiratory distress, normal breath sounds, no rales, no wheezing   Cardiovascular:  Normal rate, normal rhythm, no murmurs. Integument: Left leg was significantly improved edema. There is no surrounding erythema. The wound base is clean. It does track several centimeters proximally  Neurologic:  Alert & oriented x 3, normal motor function, normal sensory function, no focal deficits noted   Psychiatric:  Speech and behavior appropriate             DATA:  N/A    ASSESSMENT:   1. Wound of left lower extremity, subsequent encounter           PLAN: Continue local wound care and follow-up with the wound care center. I did  her on the fact that the skin overlying the proximal tract may thin out and breakdown, which will allow the base of the wound to heal in more quickly.   She will follow-up with me as needed

## 2021-03-08 ENCOUNTER — IMMUNIZATION (OUTPATIENT)
Dept: PRIMARY CARE CLINIC | Age: 66
End: 2021-03-08
Payer: COMMERCIAL

## 2021-03-09 PROCEDURE — 91301 COVID-19, MODERNA VACCINE 100MCG/0.5ML DOSE: CPT

## 2021-03-09 PROCEDURE — 0011A COVID-19, MODERNA VACCINE 100MCG/0.5ML DOSE: CPT

## 2021-04-05 ENCOUNTER — IMMUNIZATION (OUTPATIENT)
Dept: PRIMARY CARE CLINIC | Age: 66
End: 2021-04-05
Payer: COMMERCIAL

## 2021-04-05 PROCEDURE — 91301 COVID-19, MODERNA VACCINE 100MCG/0.5ML DOSE: CPT | Performed by: FAMILY MEDICINE

## 2021-04-05 PROCEDURE — 0012A COVID-19, MODERNA VACCINE 100MCG/0.5ML DOSE: CPT | Performed by: FAMILY MEDICINE

## 2024-08-17 ENCOUNTER — HOSPITAL ENCOUNTER (INPATIENT)
Age: 69
LOS: 2 days | Discharge: HOME | DRG: 165 | End: 2024-08-20
Attending: EMERGENCY MEDICINE
Payer: COMMERCIAL

## 2024-08-17 ENCOUNTER — APPOINTMENT (OUTPATIENT)
Dept: CT IMAGING | Age: 69
DRG: 165 | End: 2024-08-17
Payer: COMMERCIAL

## 2024-08-17 DIAGNOSIS — I26.02 ACUTE SADDLE PULMONARY EMBOLISM WITH ACUTE COR PULMONALE (HCC): ICD-10-CM

## 2024-08-17 DIAGNOSIS — I26.99 PULMONARY EMBOLISM AND INFARCTION (HCC): Primary | ICD-10-CM

## 2024-08-17 DIAGNOSIS — I26.92 ACUTE SADDLE PULMONARY EMBOLISM WITHOUT ACUTE COR PULMONALE (HCC): ICD-10-CM

## 2024-08-17 DIAGNOSIS — I26.09 ACUTE PULMONARY EMBOLISM WITH ACUTE COR PULMONALE (HCC): ICD-10-CM

## 2024-08-17 LAB
ANION GAP SERPL CALCULATED.3IONS-SCNC: 13 MMOL/L (ref 3–16)
BASOPHILS # BLD: 0 K/UL (ref 0–0.2)
BASOPHILS NFR BLD: 0.4 %
BUN SERPL-MCNC: 23 MG/DL (ref 7–20)
CALCIUM SERPL-MCNC: 9.9 MG/DL (ref 8.3–10.6)
CHLORIDE SERPL-SCNC: 96 MMOL/L (ref 99–110)
CO2 SERPL-SCNC: 25 MMOL/L (ref 21–32)
CREAT SERPL-MCNC: 0.7 MG/DL (ref 0.6–1.2)
DEPRECATED RDW RBC AUTO: 14.2 % (ref 12.4–15.4)
EOSINOPHIL # BLD: 0.1 K/UL (ref 0–0.6)
EOSINOPHIL NFR BLD: 1 %
GFR SERPLBLD CREATININE-BSD FMLA CKD-EPI: >90 ML/MIN/{1.73_M2}
GLUCOSE SERPL-MCNC: 132 MG/DL (ref 70–99)
HCT VFR BLD AUTO: 43.5 % (ref 36–48)
HGB BLD-MCNC: 14.4 G/DL (ref 12–16)
LYMPHOCYTES # BLD: 2 K/UL (ref 1–5.1)
LYMPHOCYTES NFR BLD: 18.8 %
MCH RBC QN AUTO: 26.4 PG (ref 26–34)
MCHC RBC AUTO-ENTMCNC: 33.1 G/DL (ref 31–36)
MCV RBC AUTO: 79.8 FL (ref 80–100)
MONOCYTES # BLD: 1.4 K/UL (ref 0–1.3)
MONOCYTES NFR BLD: 12.9 %
NEUTROPHILS # BLD: 7.1 K/UL (ref 1.7–7.7)
NEUTROPHILS NFR BLD: 66.9 %
PLATELET # BLD AUTO: 159 K/UL (ref 135–450)
PMV BLD AUTO: 9 FL (ref 5–10.5)
POTASSIUM SERPL-SCNC: 3.7 MMOL/L (ref 3.5–5.1)
RBC # BLD AUTO: 5.45 M/UL (ref 4–5.2)
SODIUM SERPL-SCNC: 134 MMOL/L (ref 136–145)
WBC # BLD AUTO: 10.5 K/UL (ref 4–11)

## 2024-08-17 PROCEDURE — 85025 COMPLETE CBC W/AUTO DIFF WBC: CPT

## 2024-08-17 PROCEDURE — 99285 EMERGENCY DEPT VISIT HI MDM: CPT

## 2024-08-17 PROCEDURE — 96375 TX/PRO/DX INJ NEW DRUG ADDON: CPT

## 2024-08-17 PROCEDURE — 6360000004 HC RX CONTRAST MEDICATION: Performed by: EMERGENCY MEDICINE

## 2024-08-17 PROCEDURE — 6360000002 HC RX W HCPCS: Performed by: EMERGENCY MEDICINE

## 2024-08-17 PROCEDURE — 96374 THER/PROPH/DIAG INJ IV PUSH: CPT

## 2024-08-17 PROCEDURE — 71260 CT THORAX DX C+: CPT

## 2024-08-17 PROCEDURE — 80048 BASIC METABOLIC PNL TOTAL CA: CPT

## 2024-08-17 PROCEDURE — 36415 COLL VENOUS BLD VENIPUNCTURE: CPT

## 2024-08-17 RX ORDER — ORPHENADRINE CITRATE 30 MG/ML
30 INJECTION INTRAMUSCULAR; INTRAVENOUS ONCE
Status: COMPLETED | OUTPATIENT
Start: 2024-08-17 | End: 2024-08-17

## 2024-08-17 RX ORDER — MORPHINE SULFATE 2 MG/ML
2 INJECTION, SOLUTION INTRAMUSCULAR; INTRAVENOUS ONCE
Status: COMPLETED | OUTPATIENT
Start: 2024-08-17 | End: 2024-08-17

## 2024-08-17 RX ADMIN — ORPHENADRINE CITRATE 30 MG: 60 INJECTION INTRAMUSCULAR; INTRAVENOUS at 23:10

## 2024-08-17 RX ADMIN — IOPAMIDOL 75 ML: 755 INJECTION, SOLUTION INTRAVENOUS at 23:53

## 2024-08-17 RX ADMIN — MORPHINE SULFATE 2 MG: 2 INJECTION, SOLUTION INTRAMUSCULAR; INTRAVENOUS at 23:09

## 2024-08-17 ASSESSMENT — PAIN - FUNCTIONAL ASSESSMENT: PAIN_FUNCTIONAL_ASSESSMENT: 0-10

## 2024-08-17 ASSESSMENT — PAIN SCALES - GENERAL
PAINLEVEL_OUTOF10: 8
PAINLEVEL_OUTOF10: 2
PAINLEVEL_OUTOF10: 10

## 2024-08-17 ASSESSMENT — LIFESTYLE VARIABLES
HOW OFTEN DO YOU HAVE A DRINK CONTAINING ALCOHOL: NEVER
HOW MANY STANDARD DRINKS CONTAINING ALCOHOL DO YOU HAVE ON A TYPICAL DAY: PATIENT DOES NOT DRINK

## 2024-08-17 ASSESSMENT — PAIN DESCRIPTION - DESCRIPTORS: DESCRIPTORS: SPASM;SHOOTING

## 2024-08-17 ASSESSMENT — PAIN DESCRIPTION - PAIN TYPE: TYPE: ACUTE PAIN

## 2024-08-17 ASSESSMENT — PAIN DESCRIPTION - LOCATION: LOCATION: BACK

## 2024-08-17 ASSESSMENT — PAIN DESCRIPTION - ORIENTATION: ORIENTATION: RIGHT

## 2024-08-17 ASSESSMENT — PAIN DESCRIPTION - FREQUENCY: FREQUENCY: CONTINUOUS

## 2024-08-18 PROBLEM — I26.99 PULMONARY EMBOLISM AND INFARCTION (HCC): Status: ACTIVE | Noted: 2024-08-18

## 2024-08-18 LAB
ANTI-XA UNFRAC HEPARIN: 0.39 IU/ML (ref 0.3–0.7)
ANTI-XA UNFRAC HEPARIN: 0.51 IU/ML (ref 0.3–0.7)
ANTI-XA UNFRAC HEPARIN: 0.88 IU/ML (ref 0.3–0.7)
APTT BLD: 32.9 SEC (ref 22.1–36.4)
NT-PROBNP SERPL-MCNC: 947 PG/ML (ref 0–124)
TROPONIN, HIGH SENSITIVITY: 15 NG/L (ref 0–14)

## 2024-08-18 PROCEDURE — 83880 ASSAY OF NATRIURETIC PEPTIDE: CPT

## 2024-08-18 PROCEDURE — 6370000000 HC RX 637 (ALT 250 FOR IP): Performed by: STUDENT IN AN ORGANIZED HEALTH CARE EDUCATION/TRAINING PROGRAM

## 2024-08-18 PROCEDURE — 36415 COLL VENOUS BLD VENIPUNCTURE: CPT

## 2024-08-18 PROCEDURE — 84484 ASSAY OF TROPONIN QUANT: CPT

## 2024-08-18 PROCEDURE — 99222 1ST HOSP IP/OBS MODERATE 55: CPT | Performed by: SURGERY

## 2024-08-18 PROCEDURE — 6360000002 HC RX W HCPCS: Performed by: STUDENT IN AN ORGANIZED HEALTH CARE EDUCATION/TRAINING PROGRAM

## 2024-08-18 PROCEDURE — 6370000000 HC RX 637 (ALT 250 FOR IP): Performed by: EMERGENCY MEDICINE

## 2024-08-18 PROCEDURE — 85520 HEPARIN ASSAY: CPT

## 2024-08-18 PROCEDURE — 6360000002 HC RX W HCPCS: Performed by: EMERGENCY MEDICINE

## 2024-08-18 PROCEDURE — 85730 THROMBOPLASTIN TIME PARTIAL: CPT

## 2024-08-18 PROCEDURE — 2060000000 HC ICU INTERMEDIATE R&B

## 2024-08-18 RX ORDER — ONDANSETRON 4 MG/1
4 TABLET, ORALLY DISINTEGRATING ORAL EVERY 8 HOURS PRN
Status: DISCONTINUED | OUTPATIENT
Start: 2024-08-18 | End: 2024-08-20 | Stop reason: HOSPADM

## 2024-08-18 RX ORDER — HEPARIN SODIUM 1000 [USP'U]/ML
7500 INJECTION, SOLUTION INTRAVENOUS; SUBCUTANEOUS PRN
Status: DISCONTINUED | OUTPATIENT
Start: 2024-08-18 | End: 2024-08-20

## 2024-08-18 RX ORDER — ACETAMINOPHEN 650 MG/1
650 SUPPOSITORY RECTAL EVERY 6 HOURS PRN
Status: DISCONTINUED | OUTPATIENT
Start: 2024-08-18 | End: 2024-08-20 | Stop reason: HOSPADM

## 2024-08-18 RX ORDER — OXYCODONE HYDROCHLORIDE 5 MG/1
5 TABLET ORAL EVERY 6 HOURS PRN
Status: DISCONTINUED | OUTPATIENT
Start: 2024-08-18 | End: 2024-08-18

## 2024-08-18 RX ORDER — HEPARIN SODIUM 1000 [USP'U]/ML
7500 INJECTION, SOLUTION INTRAVENOUS; SUBCUTANEOUS ONCE
Status: COMPLETED | OUTPATIENT
Start: 2024-08-18 | End: 2024-08-18

## 2024-08-18 RX ORDER — OXYCODONE HYDROCHLORIDE 5 MG/1
10 TABLET ORAL EVERY 4 HOURS PRN
Status: DISCONTINUED | OUTPATIENT
Start: 2024-08-18 | End: 2024-08-20 | Stop reason: HOSPADM

## 2024-08-18 RX ORDER — METHOCARBAMOL 500 MG/1
500 TABLET, FILM COATED ORAL 3 TIMES DAILY
Status: DISCONTINUED | OUTPATIENT
Start: 2024-08-18 | End: 2024-08-18

## 2024-08-18 RX ORDER — HEPARIN SODIUM 1000 [USP'U]/ML
3800 INJECTION, SOLUTION INTRAVENOUS; SUBCUTANEOUS PRN
Status: DISCONTINUED | OUTPATIENT
Start: 2024-08-18 | End: 2024-08-20

## 2024-08-18 RX ORDER — METHOCARBAMOL 500 MG/1
500 TABLET, FILM COATED ORAL 3 TIMES DAILY PRN
Status: DISCONTINUED | OUTPATIENT
Start: 2024-08-18 | End: 2024-08-18

## 2024-08-18 RX ORDER — HEPARIN SODIUM 10000 [USP'U]/100ML
1690 INJECTION, SOLUTION INTRAVENOUS CONTINUOUS
Status: DISCONTINUED | OUTPATIENT
Start: 2024-08-18 | End: 2024-08-20

## 2024-08-18 RX ORDER — ONDANSETRON 2 MG/ML
4 INJECTION INTRAMUSCULAR; INTRAVENOUS EVERY 6 HOURS PRN
Status: DISCONTINUED | OUTPATIENT
Start: 2024-08-18 | End: 2024-08-20 | Stop reason: HOSPADM

## 2024-08-18 RX ORDER — ACETAMINOPHEN 325 MG/1
650 TABLET ORAL EVERY 6 HOURS PRN
Status: DISCONTINUED | OUTPATIENT
Start: 2024-08-18 | End: 2024-08-20 | Stop reason: HOSPADM

## 2024-08-18 RX ORDER — METHOCARBAMOL 500 MG/1
1000 TABLET, FILM COATED ORAL 3 TIMES DAILY PRN
Status: DISCONTINUED | OUTPATIENT
Start: 2024-08-18 | End: 2024-08-19

## 2024-08-18 RX ORDER — OXYCODONE HYDROCHLORIDE 5 MG/1
5 TABLET ORAL ONCE
Status: COMPLETED | OUTPATIENT
Start: 2024-08-18 | End: 2024-08-18

## 2024-08-18 RX ORDER — OXYCODONE HYDROCHLORIDE 5 MG/1
5 TABLET ORAL EVERY 4 HOURS PRN
Status: DISCONTINUED | OUTPATIENT
Start: 2024-08-18 | End: 2024-08-20 | Stop reason: HOSPADM

## 2024-08-18 RX ADMIN — HEPARIN SODIUM 1700 UNITS/HR: 10000 INJECTION, SOLUTION INTRAVENOUS at 01:42

## 2024-08-18 RX ADMIN — HEPARIN SODIUM 7500 UNITS: 1000 INJECTION INTRAVENOUS; SUBCUTANEOUS at 01:41

## 2024-08-18 RX ADMIN — OXYCODONE HYDROCHLORIDE 5 MG: 5 TABLET ORAL at 13:00

## 2024-08-18 RX ADMIN — OXYCODONE HYDROCHLORIDE 5 MG: 5 TABLET ORAL at 01:50

## 2024-08-18 RX ADMIN — HYDROMORPHONE HYDROCHLORIDE 0.25 MG: 1 INJECTION, SOLUTION INTRAMUSCULAR; INTRAVENOUS; SUBCUTANEOUS at 15:38

## 2024-08-18 RX ADMIN — HEPARIN SODIUM 1500 UNITS/HR: 10000 INJECTION, SOLUTION INTRAVENOUS at 18:10

## 2024-08-18 RX ADMIN — OXYCODONE HYDROCHLORIDE 10 MG: 5 TABLET ORAL at 23:11

## 2024-08-18 RX ADMIN — OXYCODONE HYDROCHLORIDE 10 MG: 5 TABLET ORAL at 17:07

## 2024-08-18 RX ADMIN — METHOCARBAMOL 500 MG: 500 TABLET ORAL at 16:13

## 2024-08-18 RX ADMIN — METHOCARBAMOL 500 MG: 500 TABLET ORAL at 09:03

## 2024-08-18 RX ADMIN — HYDROMORPHONE HYDROCHLORIDE 0.25 MG: 1 INJECTION, SOLUTION INTRAMUSCULAR; INTRAVENOUS; SUBCUTANEOUS at 04:19

## 2024-08-18 ASSESSMENT — PAIN DESCRIPTION - LOCATION
LOCATION: BACK

## 2024-08-18 ASSESSMENT — PAIN SCALES - GENERAL
PAINLEVEL_OUTOF10: 7
PAINLEVEL_OUTOF10: 9
PAINLEVEL_OUTOF10: 7
PAINLEVEL_OUTOF10: 8
PAINLEVEL_OUTOF10: 9
PAINLEVEL_OUTOF10: 8
PAINLEVEL_OUTOF10: 9
PAINLEVEL_OUTOF10: 10
PAINLEVEL_OUTOF10: 7
PAINLEVEL_OUTOF10: 8

## 2024-08-18 ASSESSMENT — PAIN DESCRIPTION - ORIENTATION
ORIENTATION: LOWER
ORIENTATION: MID;RIGHT;LOWER
ORIENTATION: RIGHT;LOWER;MID

## 2024-08-18 ASSESSMENT — PAIN DESCRIPTION - DESCRIPTORS
DESCRIPTORS: SPASM;SHOOTING
DESCRIPTORS: SPASM
DESCRIPTORS: SPASM
DESCRIPTORS: CRAMPING
DESCRIPTORS: SPASM
DESCRIPTORS: SPASM;THROBBING

## 2024-08-18 ASSESSMENT — PAIN DESCRIPTION - PAIN TYPE: TYPE: ACUTE PAIN

## 2024-08-18 NOTE — ED PROVIDER NOTES
EMERGENCY DEPARTMENT ENCOUNTER     [facility] A2 CARD TELEMETRY     Pt Name: Rafaela Jain   MRN: [id number]   Birthdate 1955   Date of evaluation: 8/17/2024   Provider: Amber Campbell MD   PCP: Prasanth Arora MD   Note Started: 11:28 PM EDT 8/17/24     CHIEF COMPLAINT:     Chief Complaint   Patient presents with    Back Pain     Back pain that started yesterday. States they feel like spasms. Diagnosed with covid on the 9th. States the pain is radiating up the back to the shoulder. No hx back issues.         HISTORY OF PRESENT ILLNESS:  Adult female who comes in with right-sided flank pain since yesterday evening.  She said it started as a spasm in the right back and is worsening now radiating to the shoulder.  The pain is so severe that it takes her breath away.  She denies any injuries.  She recently had COVID-19.  No chest pain.  Later she does admit that whenever she takes a deep breath the pain worsens.  She tried calling a telehealth provider and they recommended she come to the emergency room.  Her initial COVID symptoms of cough and malaise have improved.  No leg swelling or leg pain.  No previous history of venous thromboembolism.    PHYSICAL EXAM:    ED Triage Vitals [08/17/24 2209]   BP Systolic BP Percentile Diastolic BP Percentile Temp Temp Source Pulse Respirations SpO2   (!) 153/99 -- -- 98.5 °F (36.9 °C) Oral (!) 102 17 98 %      Height Weight - Scale         1.854 m (6' 1\") 122.5 kg (270 lb)              Physical Exam  Vitals and nursing note reviewed.   Constitutional:       Appearance: Normal appearance. She is well-developed. She is not ill-appearing.      Comments: Very uncomfortable appearing adult female   HENT:      Head: Normocephalic and atraumatic.      Right Ear: External ear normal.      Left Ear: External ear normal.      Nose: Nose normal.   Eyes:      General: No scleral icterus.        Right eye: No discharge.         Left eye: No discharge.      Conjunctiva/sclera:

## 2024-08-18 NOTE — ED NOTES
Patient Name: Rafaela Jain  :  1955  68 y.o.  MRN:  [id number]  Preferred Name  [facility]   ED Room #:    Family/Caregiver Present yes  Restraints no  Sitter no  Sepsis Risk Score      Situation  Code Status: Prior No additional code details.    Allergies: Patient has no known allergies.  Weight: Patient Vitals for the past 96 hrs (Last 3 readings):   Weight   24 2209 122.5 kg (270 lb)     Arrived from: home  Chief Complaint:   Chief Complaint   Patient presents with    Back Pain     Back pain that started yesterday. States they feel like spasms. Diagnosed with covid on the . States the pain is radiating up the back to the shoulder. No hx back issues.      Hospital Problem/Diagnosis:  Principal Problem:    Pulmonary embolism and infarction (HCC)  Resolved Problems:    * No resolved hospital problems. *    Imaging:   CT CHEST PULMONARY EMBOLISM W CONTRAST   Final Result   1. Moderate-size bilateral pulmonary emboli more prominent on the right side.   Saddle shaped embolus seen in the main pulmonary artery.  Without evidence of   right heart strain.   2. Sub pleural ill-defined opacity is seen in the posterior basal segment of   the right lower lobe may represent pulmonary infarction.           Abnormal labs:   Abnormal Labs Reviewed   CBC WITH AUTO DIFFERENTIAL - Abnormal; Notable for the following components:       Result Value    RBC 5.45 (*)     MCV 79.8 (*)     Monocytes Absolute 1.4 (*)     All other components within normal limits   BASIC METABOLIC PANEL W/ REFLEX TO MG FOR LOW K - Abnormal; Notable for the following components:    Sodium 134 (*)     Chloride 96 (*)     Glucose 132 (*)     BUN 23 (*)     All other components within normal limits     Critical values: no  Intervention for critical value(s): See CTPE      Abnormal Assessment Findings: PE    Background  History: History reviewed. No pertinent past medical history.    Assessment    Vitals/MEWS: MEWS Score: 2  Level of Consciousness:

## 2024-08-18 NOTE — CONSULTS
Pharmacy to Manage Heparin Infusion per Hospital Nomogram    Dx: PE  Pt wt = 94.2 kg adj.  Baseline aPTT =     Oral factor Xa-inhibitors may alter and elevate anti-Xa levels used for unfractionated heparin monitoring. As a result, anti-Xa monitoring is not accurate while Xa-inhibitor activity is detectable. Utilize aPTT monitoring when patient received an oral factor Xa-inhibitor (apixaban, betrixaban, edoxaban or rivaroxaban) within 72 hours prior to admission (please document last administration time). The goal is to allow a washout of oral factor Xa-inhibitors by using aPTT for 72 hours, then change to ant-Xa levels for UFH.     Heparin (weight-based) Infusion: VTE/DVT/PE  Heparin 80 units/kg IVP bolus (max 10,000 units) followed by Heparin infusion at 18 units/kg/hr (recommended max initial rate: 2100 units/hr).  Recheck anti-Xa (unless aPTT being used) in 6 hours.  Goal anti-Xa 0.3-0.7 IU/mL  Goal aPTT =  seconds.  Luciano José Pharm D.8/18/2024 12:48 AM    8/18/2024 at 0451  Anti-Xa Unfrac Heparin   0.88   IU/mL  - Decrease Heparin infusion to _1500_ units/hr.   - Recheck Anti-Xa in 6 hours at 1200  Luciano José Pharm GRACE.8/18/2024 5:47 AM    8/18/2024  Recent Labs     08/18/24  1120   ANTIXAUHEP 0.51   - No change to Heparin at this time, continue Heparin infusion at _1500_ units/hr.   - Recheck Anti-Xa in 6 hours at 1730.  Deshaun Flower PharmD    8/18/2024 12:08 PM     ---------------------------------  8/18 1739  High-Dose Heparin Drip  Current Rate: 1500 units/hr  8/18 @ 1710 Anti-Xa Level= 0.39  Plan: Per pharmacy dosing, we will not make any changes at this time and move to daily Anti-Xa level monitoring    Next Anti-Xa Level: 8/19 @ 0600  Khushi Tavarez PharmD 8/18/2024 5:39 PM    8/19/2024 at 0502  Anti-Xa Unfrac Heparin   0.33   IU/mL  - No change to Heparin at this time, continue Heparin infusion at _1500_ units/hr.  - Recheck Anti-Xa daily  Tesha Cruz D.8/19/2024 5:27

## 2024-08-18 NOTE — PROGRESS NOTES
Patient seen. No new issues or complaints.  Patient states that she feels \"okay \".  Continues to have back spasms that is not alleviated by medication.  Continues to have pleuritic chest pain.  Denies shortness of breath.    Pulmonary embolism --cont heparin, consult vascular surgery    Back spasms --Robaxin ordered    Electronically signed by Chuck Menjivar DO on 8/18/2024 at 12:47 PM

## 2024-08-18 NOTE — PROGRESS NOTES
4 Eyes Skin Assessment     NAME:  Rafaela Jain  YOB: 1955  MEDICAL RECORD NUMBER:  [id number]    The patient is being assessed for  Admission    I agree that at least one RN has performed a thorough Head to Toe Skin Assessment on the patient. ALL assessment sites listed below have been assessed.      Areas assessed by both nurses:    Head, Face, Ears, Shoulders, Back, Chest, Arms, Elbows, Hands, Sacrum. Buttock, Coccyx, Ischium, Legs. Feet and Heels, and Under Medical Devices         Does the Patient have a Wound? No noted wound(s)       Corwin Prevention initiated by RN: Yes  Wound Care Orders initiated by RN: No    Pressure Injury (Stage 3,4, Unstageable, DTI, NWPT, and Complex wounds) if present, place Wound referral order by RN under : No    New Ostomies, if present place, Ostomy referral order under : No     Nurse 1 eSignature: Electronically signed by Tish Christensen RN on 8/18/24 at 3:19 AM EDT    **SHARE this note so that the co-signing nurse can place an eSignature**    Nurse 2 eSignature: Electronically signed by Denisa Velazquez RN on 8/18/24 at 5:05 AM EDT

## 2024-08-18 NOTE — H&P
History and Physical      Name:  Rafaela Jain /Age/Sex: 1955  (68 y.o. female)   MRN & CSN:  [id number] & [id number] Encounter Date/Time: 2024 1:08 AM EDT   Location:   PCP: Prasanth Arora MD       Assessment and Plan:   Rafaela Jain is a 68 y.o. female with no significant documented medical conditions or home medications presented from home with right mid back pain    Acute bilateral pulmonary emboli with pulmonary infarction without cor pulmonale  CTA chest reviewed moderate sized bilateral PE, saddle shaped embolus and main pulmonary artery, no evidence of right heart strain ill-defined opacity in posterior basal segment of right lower lobe  PESI score 87 class 3 intermediate risk, likely provoked with recent COVID-19 infection, international travel and left ankle fracture  IV heparin infusion  Pain control as needed  Troponin and Pro BNP, check echocardiogram and bilateral DVT ultrasound    Inpatient PCU telemetry  Full code    Disposition:     Current Living situation: Home  Expected Disposition: Home  Estimated D/C: 2 days    Diet ADULT DIET; Regular; Low Sodium (2 gm)  ADULT ORAL NUTRITION SUPPLEMENT; Breakfast, AM Snack, Lunch, PM Snack, Dinner, HS Snack; Standard High Calorie/High Protein Oral Supplement   DVT Prophylaxis [] Lovenox, [x]  Heparin, [] SCDs, [] Ambulation,  [] Eliquis, [] Xarelto, [] Coumadin   Code Status Full Code   Surrogate Decision Maker/ DUGLAS Good     Personally reviewed Lab Studies and Imaging   Discussed management of the case with ER provider who recommended admission due to right mid back pain  Drugs that require monitoring for toxicity include IV heparin and the method of monitoring was antifactor Xa level    History from:     Patient     History of Present Illness:     Chief Complaint   Patient presents with    Back Pain     Back pain that started yesterday. States they feel like spasms. Diagnosed with covid on the . States the pain is radiating

## 2024-08-18 NOTE — CONSULTS
Vascular Surgery Consultation    Date of Admission:  8/17/2024 10:03 PM  Date of Consultation:  8/18/2024    PCP:  Prasanth Arora MD       Chief Complaint: Right sided upper back pain.     History of Present Illness:   We are asked to see this patient in consultation regarding acute PE.   Rafaela Jain is a 68 y.o. female who reports trip to [facility] in June.  She had a left ankle fracture while there and was put in a walking splint.  She also recently had COVID-19 infection.    Acute onset pleuritic right sided Chest/back pain last pm.    Reports some SOB due to pain.    No prior history of thromboembolic events.       Past Medical History:  History reviewed. No pertinent past medical history.    Past Surgical History:  History reviewed. No pertinent surgical history.    Home Medications:   Prior to Admission medications    Medication Sig Start Date End Date Taking? Authorizing Provider   ibuprofen (ADVIL;MOTRIN) 800 MG tablet TAKE 1 TABLET BY MOUTH THREE TIMES DAILY WITH MEALS 1/10/19   SOTO Griffiths MD        Facility Administered Medications:     Allergies:  Patient has no known allergies.     Social History:      Social History     Socioeconomic History    Marital status:      Spouse name: Not on file    Number of children: Not on file    Years of education: Not on file    Highest education level: Not on file   Occupational History    Not on file   Tobacco Use    Smoking status: Never    Smokeless tobacco: Never   Vaping Use    Vaping status: Never Used   Substance and Sexual Activity    Alcohol use: Not on file    Drug use: No    Sexual activity: Not on file   Other Topics Concern    Not on file   Social History Narrative    Not on file     Social Determinants of Health     Financial Resource Strain: Not on file   Food Insecurity: No Food Insecurity (8/18/2024)    Hunger Vital Sign     Worried About Running Out of Food in the Last Year: Never true     Ran Out of Food in the

## 2024-08-19 ENCOUNTER — HOSPITAL ENCOUNTER (INPATIENT)
Dept: VASCULAR LAB | Age: 69
Discharge: HOME | DRG: 165 | End: 2024-08-21
Attending: STUDENT IN AN ORGANIZED HEALTH CARE EDUCATION/TRAINING PROGRAM
Payer: COMMERCIAL

## 2024-08-19 ENCOUNTER — APPOINTMENT (OUTPATIENT)
Age: 69
DRG: 165 | End: 2024-08-19
Payer: COMMERCIAL

## 2024-08-19 PROBLEM — I26.09 ACUTE PULMONARY EMBOLISM WITH ACUTE COR PULMONALE (HCC): Status: ACTIVE | Noted: 2024-08-17

## 2024-08-19 LAB
ANTI-XA UNFRAC HEPARIN: 0.33 IU/ML (ref 0.3–0.7)
ECHO AO ASC DIAM: 3.2 CM
ECHO AO ASCENDING AORTA INDEX: 1.4 CM/M2
ECHO AO ROOT DIAM: 2.8 CM
ECHO AO ROOT INDEX: 1.23 CM/M2
ECHO AV CUSP MM: 1.4 CM
ECHO AV PEAK GRADIENT: 15 MMHG
ECHO AV PEAK GRADIENT: 15 MMHG
ECHO AV PEAK VELOCITY: 1.9 M/S
ECHO AV VELOCITY RATIO: 0.63
ECHO BSA: 2.51 M2
ECHO EST RA PRESSURE: 8 MMHG
ECHO LA AREA 2C: 23.4 CM2
ECHO LA AREA 4C: 21.7 CM2
ECHO LA DIAMETER INDEX: 1.49 CM/M2
ECHO LA DIAMETER: 3.4 CM
ECHO LA MAJOR AXIS: 6.6 CM
ECHO LA MINOR AXIS: 6.2 CM
ECHO LA TO AORTIC ROOT RATIO: 1.21
ECHO LA VOL BP: 66 ML (ref 22–52)
ECHO LA VOL MOD A2C: 71 ML (ref 22–52)
ECHO LA VOL MOD A4C: 57 ML (ref 22–52)
ECHO LA VOL/BSA BIPLANE: 29 ML/M2 (ref 16–34)
ECHO LA VOLUME INDEX MOD A2C: 31 ML/M2 (ref 16–34)
ECHO LA VOLUME INDEX MOD A4C: 25 ML/M2 (ref 16–34)
ECHO LV E' LATERAL VELOCITY: 21 CM/S
ECHO LV E' SEPTAL VELOCITY: 9 CM/S
ECHO LV FRACTIONAL SHORTENING: 32 % (ref 28–44)
ECHO LV INTERNAL DIMENSION DIASTOLE INDEX: 1.49 CM/M2
ECHO LV INTERNAL DIMENSION DIASTOLIC: 3.4 CM (ref 3.9–5.3)
ECHO LV INTERNAL DIMENSION SYSTOLIC INDEX: 1.01 CM/M2
ECHO LV INTERNAL DIMENSION SYSTOLIC: 2.3 CM
ECHO LV ISOVOLUMETRIC RELAXATION TIME (IVRT): 77 MS
ECHO LV IVSD: 1 CM (ref 0.6–0.9)
ECHO LV MASS 2D: 106.3 G (ref 67–162)
ECHO LV MASS INDEX 2D: 46.6 G/M2 (ref 43–95)
ECHO LV POSTERIOR WALL DIASTOLIC: 1.1 CM (ref 0.6–0.9)
ECHO LV RELATIVE WALL THICKNESS RATIO: 0.65
ECHO LVOT PEAK GRADIENT: 6 MMHG
ECHO LVOT PEAK VELOCITY: 1.2 M/S
ECHO MV E VELOCITY: 0.9 M/S
ECHO MV E/E' LATERAL: 4.29
ECHO MV E/E' RATIO (AVERAGED): 7.14
ECHO MV E/E' SEPTAL: 10
ECHO RA AREA 4C: 19.5 CM2
ECHO RA END SYSTOLIC VOLUME APICAL 4 CHAMBER INDEX BSA: 22 ML/M2
ECHO RA VOLUME: 50 ML
ECHO RIGHT VENTRICULAR SYSTOLIC PRESSURE (RVSP): 43 MMHG
ECHO RV FREE WALL PEAK S': 14 CM/S
ECHO RV TAPSE: 1.8 CM (ref 1.7–?)
ECHO TV REGURGITANT MAX VELOCITY: 2.95 M/S
ECHO TV REGURGITANT PEAK GRADIENT: 36 MMHG

## 2024-08-19 PROCEDURE — 36015 PLACE CATHETER IN ARTERY: CPT | Performed by: SURGERY

## 2024-08-19 PROCEDURE — C1760 CLOSURE DEV, VASC: HCPCS | Performed by: SURGERY

## 2024-08-19 PROCEDURE — 93306 TTE W/DOPPLER COMPLETE: CPT | Performed by: INTERNAL MEDICINE

## 2024-08-19 PROCEDURE — 76937 US GUIDE VASCULAR ACCESS: CPT | Performed by: SURGERY

## 2024-08-19 PROCEDURE — 85520 HEPARIN ASSAY: CPT

## 2024-08-19 PROCEDURE — 37184 PRIM ART M-THRMBC 1ST VSL: CPT | Performed by: SURGERY

## 2024-08-19 PROCEDURE — 2720000010 HC SURG SUPPLY STERILE: Performed by: SURGERY

## 2024-08-19 PROCEDURE — 99153 MOD SED SAME PHYS/QHP EA: CPT | Performed by: SURGERY

## 2024-08-19 PROCEDURE — C1769 GUIDE WIRE: HCPCS | Performed by: SURGERY

## 2024-08-19 PROCEDURE — C1757 CATH, THROMBECTOMY/EMBOLECT: HCPCS | Performed by: SURGERY

## 2024-08-19 PROCEDURE — 6370000000 HC RX 637 (ALT 250 FOR IP): Performed by: STUDENT IN AN ORGANIZED HEALTH CARE EDUCATION/TRAINING PROGRAM

## 2024-08-19 PROCEDURE — 6370000000 HC RX 637 (ALT 250 FOR IP)

## 2024-08-19 PROCEDURE — 6360000002 HC RX W HCPCS: Performed by: STUDENT IN AN ORGANIZED HEALTH CARE EDUCATION/TRAINING PROGRAM

## 2024-08-19 PROCEDURE — 93970 EXTREMITY STUDY: CPT | Performed by: SURGERY

## 2024-08-19 PROCEDURE — 7100000011 HC PHASE II RECOVERY - ADDTL 15 MIN: Performed by: SURGERY

## 2024-08-19 PROCEDURE — 2580000003 HC RX 258: Performed by: SURGERY

## 2024-08-19 PROCEDURE — C1894 INTRO/SHEATH, NON-LASER: HCPCS | Performed by: SURGERY

## 2024-08-19 PROCEDURE — 36415 COLL VENOUS BLD VENIPUNCTURE: CPT

## 2024-08-19 PROCEDURE — 93970 EXTREMITY STUDY: CPT

## 2024-08-19 PROCEDURE — 99152 MOD SED SAME PHYS/QHP 5/>YRS: CPT | Performed by: SURGERY

## 2024-08-19 PROCEDURE — 7100000010 HC PHASE II RECOVERY - FIRST 15 MIN: Performed by: SURGERY

## 2024-08-19 PROCEDURE — 02CR3ZZ EXTIRPATION OF MATTER FROM LEFT PULMONARY ARTERY, PERCUTANEOUS APPROACH: ICD-10-PCS | Performed by: SURGERY

## 2024-08-19 PROCEDURE — 2060000000 HC ICU INTERMEDIATE R&B

## 2024-08-19 PROCEDURE — 36014 PLACE CATHETER IN ARTERY: CPT | Performed by: SURGERY

## 2024-08-19 PROCEDURE — 93306 TTE W/DOPPLER COMPLETE: CPT

## 2024-08-19 PROCEDURE — 6360000002 HC RX W HCPCS: Performed by: SURGERY

## 2024-08-19 PROCEDURE — 2709999900 HC NON-CHARGEABLE SUPPLY: Performed by: SURGERY

## 2024-08-19 PROCEDURE — 02CQ3ZZ EXTIRPATION OF MATTER FROM RIGHT PULMONARY ARTERY, PERCUTANEOUS APPROACH: ICD-10-PCS | Performed by: SURGERY

## 2024-08-19 PROCEDURE — C1753 CATH, INTRAVAS ULTRASOUND: HCPCS | Performed by: SURGERY

## 2024-08-19 PROCEDURE — 6360000004 HC RX CONTRAST MEDICATION: Performed by: SURGERY

## 2024-08-19 RX ORDER — METHOCARBAMOL 500 MG/1
1500 TABLET, FILM COATED ORAL 3 TIMES DAILY PRN
Status: DISCONTINUED | OUTPATIENT
Start: 2024-08-19 | End: 2024-08-20 | Stop reason: HOSPADM

## 2024-08-19 RX ORDER — SODIUM CHLORIDE 9 MG/ML
INJECTION, SOLUTION INTRAVENOUS CONTINUOUS PRN
Status: COMPLETED | OUTPATIENT
Start: 2024-08-19 | End: 2024-08-19

## 2024-08-19 RX ORDER — MIDAZOLAM HYDROCHLORIDE 1 MG/ML
INJECTION INTRAMUSCULAR; INTRAVENOUS PRN
Status: DISCONTINUED | OUTPATIENT
Start: 2024-08-19 | End: 2024-08-19 | Stop reason: HOSPADM

## 2024-08-19 RX ORDER — FENTANYL CITRATE 50 UG/ML
INJECTION, SOLUTION INTRAMUSCULAR; INTRAVENOUS PRN
Status: DISCONTINUED | OUTPATIENT
Start: 2024-08-19 | End: 2024-08-19 | Stop reason: HOSPADM

## 2024-08-19 RX ORDER — HEPARIN SODIUM 1000 [USP'U]/ML
INJECTION, SOLUTION INTRAVENOUS; SUBCUTANEOUS PRN
Status: DISCONTINUED | OUTPATIENT
Start: 2024-08-19 | End: 2024-08-19 | Stop reason: HOSPADM

## 2024-08-19 RX ORDER — LIDOCAINE 4 G/G
1 PATCH TOPICAL DAILY
Status: DISCONTINUED | OUTPATIENT
Start: 2024-08-19 | End: 2024-08-20 | Stop reason: HOSPADM

## 2024-08-19 RX ADMIN — OXYCODONE HYDROCHLORIDE 10 MG: 5 TABLET ORAL at 23:52

## 2024-08-19 RX ADMIN — HEPARIN SODIUM 1500 UNITS/HR: 10000 INJECTION, SOLUTION INTRAVENOUS at 10:35

## 2024-08-19 RX ADMIN — OXYCODONE HYDROCHLORIDE 10 MG: 5 TABLET ORAL at 18:48

## 2024-08-19 RX ADMIN — OXYCODONE HYDROCHLORIDE 10 MG: 5 TABLET ORAL at 13:13

## 2024-08-19 RX ADMIN — OXYCODONE HYDROCHLORIDE 10 MG: 5 TABLET ORAL at 05:39

## 2024-08-19 RX ADMIN — METHOCARBAMOL 1000 MG: 500 TABLET ORAL at 13:14

## 2024-08-19 ASSESSMENT — PAIN SCALES - GENERAL
PAINLEVEL_OUTOF10: 8
PAINLEVEL_OUTOF10: 6
PAINLEVEL_OUTOF10: 0
PAINLEVEL_OUTOF10: 5
PAINLEVEL_OUTOF10: 9
PAINLEVEL_OUTOF10: 8

## 2024-08-19 ASSESSMENT — PAIN DESCRIPTION - LOCATION
LOCATION: BACK
LOCATION: GROIN;BACK
LOCATION: BACK
LOCATION: BACK
LOCATION: GENERALIZED
LOCATION: GROIN;BACK

## 2024-08-19 ASSESSMENT — PAIN - FUNCTIONAL ASSESSMENT: PAIN_FUNCTIONAL_ASSESSMENT: PREVENTS OR INTERFERES SOME ACTIVE ACTIVITIES AND ADLS

## 2024-08-19 ASSESSMENT — PAIN DESCRIPTION - DESCRIPTORS
DESCRIPTORS: SPASM;ACHING
DESCRIPTORS: SHARP
DESCRIPTORS: ACHING;SHOOTING

## 2024-08-19 ASSESSMENT — PAIN DESCRIPTION - ORIENTATION: ORIENTATION: LOWER

## 2024-08-19 NOTE — PLAN OF CARE
Problem: Pain  Goal: Verbalizes/displays adequate comfort level or baseline comfort level  Outcome: Progressing  Note: Pt will be satisfied with pain control. Pt uses numeric pain rating scale with reassessments after pain med administration. Will continue to monitor progression throughout shift.      Problem: Safety - Adult  Goal: Free from fall injury  8/19/2024 0031 by Tawana Stephens, RN  Outcome: Progressing  Note: Pt will remain free from falls throughout hospital stay. Fall precautions in place, bed alarm on, bed in lowest position with wheels locked and side rails 2/4 up. Will continue to monitor throughout shift.      Problem: ABCDS Injury Assessment  Goal: Absence of physical injury  8/19/2024 0031 by Tawana Stephens, RN  Outcome: Progressing

## 2024-08-19 NOTE — PROGRESS NOTES
Comprehensive Nutrition Assessment    Type and Reason for Visit:  Initial, Positive Nutrition Screen (MST=4: weight loss, decreased appetite)    Nutrition Recommendations/Plan:   Continue low sodium diet - monitor need for liberalization   Monitor need for ONS   Encourage PO intakes   Monitor nutrition adequacy, pertinent labs, bowel habits, wt changes, and clinical progress     Malnutrition Assessment:  Malnutrition Status:  Insufficient data (08/19/24 1452)      Nutrition Assessment:    Pt admitted with pulmonary embolism. Pt unavailable at time of visit, s/p thrombectomy this AM. Positive nutrition screen for weight loss and appetite. Diet advanced to low sodium this afternoon. MIRELLA weight oss, no recent weight hx per EMR. Will monitor PO intakes and need for ONS. Will monitor.    Nutrition Related Findings:    No labs since 8/17. Wound Type: None       Current Nutrition Intake & Therapies:    Average Meal Intake: Unable to assess  Average Supplements Intake: None Ordered  ADULT DIET; Regular; Low Sodium (2 gm)    Anthropometric Measures:  Height: 185.4 cm (6' 1\")  Ideal Body Weight (IBW): 165 lbs (75 kg)       Current Body Weight: 104.3 kg (230 lb), 139.4 % IBW.    Current BMI (kg/m2): 30.4                          BMI Categories: Obese Class 1 (BMI 30.0-34.9)    Estimated Daily Nutrient Needs:  Energy Requirements Based On: Kcal/kg (25-30)  Weight Used for Energy Requirements: Ideal  Energy (kcal/day): 7119-5032 kcal  Weight Used for Protein Requirements: Ideal (1.0-1.2 g/kg)  Protein (g/day): 75-90 g  Method Used for Fluid Requirements: 1 ml/kcal  Fluid (ml/day): 3881-4890 ml    Nutrition Diagnosis:   Inadequate oral intake related to inadequate protein-energy intake as evidenced by poor intake prior to admission, weight loss    Nutrition Interventions:   Food and/or Nutrient Delivery: Continue Current Diet  Nutrition Education/Counseling: No recommendation at this time  Coordination of Nutrition Care: Continue

## 2024-08-19 NOTE — PROGRESS NOTES
Hospital Medicine Progress Note      Date of Admission: 8/17/2024  Hospital Day: 3    Chief Admission Complaint:  Back Pain     Subjective:  Patient seen and examined this morning. She states that she continues to have some pain in her back. She is short of breath with conversation.     Presenting Admission History:       Rafaela Jain is a 68 y.o. female with no significant documented medical conditions or home medications presented from home with right mid back pain. Patient started having right mid back and lateral chest wall pain associated with mild shortness of breath, progressively worsening since yesterday, aggravated by deep inspiration and no alleviating factors identified.  Upon arrival patient was noted to have bilateral PE.  Patient reports that over the past couple of months she has had international travel, prolonged car drive (more than 5 hours) and left ankle fracture in [place]. 2 weeks ago was diagnosed with COVID-19 for which she took Paxlovid.  During my evaluation patient was in distress due to pain, alert oriented x 3 hemodynamically stable mildly tachycardic.  No fever night sweats chills LOC dizziness nausea vomiting or diarrhea.     Assessment/Plan:      Current Principal Problem:  Pulmonary embolism and infarction (HCC)    Acute Pulmonary Embolism with Pulmonary Infarction   Acute Cor Pulmonale   - Likely provoked due to recent Covid-19 infection, international travel and left ankle fracture   - CT of chest revealed saddle embolus   - Bilateral lower extremity venous duplex's are pending  - Echo revealed right heart strain   - Vascular surgery consulted. Underwent thrombectomy on 8/19  - Continue heparin gtt  - Transition to OAC tomorrow    Physical Exam Performed:      General appearance:  Mild distress, appears stated age and cooperative.  HEENT:  Normal cephalic, atraumatic without obvious deformity. Conjunctivae/corneas clear.  Neck: No jugular venous distention.    Respiratory:

## 2024-08-19 NOTE — PROGRESS NOTES
Report given to patients nurse Jimmie. Pt transferred to room 217. CMU called and monitor is on and verified. Patient remains resting flat and heparin drip infusing.

## 2024-08-20 VITALS
SYSTOLIC BLOOD PRESSURE: 132 MMHG | DIASTOLIC BLOOD PRESSURE: 64 MMHG | HEIGHT: 72 IN | RESPIRATION RATE: 18 BRPM | TEMPERATURE: 98.2 F | BODY MASS INDEX: 31.18 KG/M2 | OXYGEN SATURATION: 98 % | WEIGHT: 230.2 LBS | HEART RATE: 91 BPM

## 2024-08-20 LAB — ANTI-XA UNFRAC HEPARIN: 0.14 IU/ML (ref 0.3–0.7)

## 2024-08-20 PROCEDURE — 36415 COLL VENOUS BLD VENIPUNCTURE: CPT

## 2024-08-20 PROCEDURE — 6360000002 HC RX W HCPCS: Performed by: STUDENT IN AN ORGANIZED HEALTH CARE EDUCATION/TRAINING PROGRAM

## 2024-08-20 PROCEDURE — 85520 HEPARIN ASSAY: CPT

## 2024-08-20 PROCEDURE — 6360000002 HC RX W HCPCS: Performed by: SURGERY

## 2024-08-20 PROCEDURE — 6370000000 HC RX 637 (ALT 250 FOR IP)

## 2024-08-20 RX ORDER — HEPARIN SODIUM 1000 [USP'U]/ML
3800 INJECTION, SOLUTION INTRAVENOUS; SUBCUTANEOUS ONCE
Status: COMPLETED | OUTPATIENT
Start: 2024-08-20 | End: 2024-08-20

## 2024-08-20 RX ORDER — METHOCARBAMOL 750 MG/1
1500 TABLET, FILM COATED ORAL 3 TIMES DAILY PRN
Qty: 60 TABLET | Refills: 0 | Status: SHIPPED | OUTPATIENT
Start: 2024-08-20 | End: 2024-08-30

## 2024-08-20 RX ADMIN — HEPARIN SODIUM 3800 UNITS: 1000 INJECTION INTRAVENOUS; SUBCUTANEOUS at 06:00

## 2024-08-20 RX ADMIN — APIXABAN 10 MG: 5 TABLET, FILM COATED ORAL at 11:23

## 2024-08-20 RX ADMIN — HEPARIN SODIUM 1500 UNITS/HR: 10000 INJECTION, SOLUTION INTRAVENOUS at 02:40

## 2024-08-20 RX ADMIN — METHOCARBAMOL 1500 MG: 500 TABLET ORAL at 09:44

## 2024-08-20 ASSESSMENT — PAIN SCALES - GENERAL
PAINLEVEL_OUTOF10: 7
PAINLEVEL_OUTOF10: 6

## 2024-08-20 ASSESSMENT — PAIN DESCRIPTION - LOCATION: LOCATION: BACK

## 2024-08-20 NOTE — CARE COORDINATION
Case Management Assessment  Initial Evaluation    Date/Time of Evaluation: 8/20/2024 11:20 AM  Assessment Completed by: Evelin Cruz RN    If patient is discharged prior to next notation, then this note serves as note for discharge by case management.    Patient Name: Rafaela Jain                   YOB: 1955  Diagnosis: Pulmonary embolism and infarction (HCC) [I26.99]  Acute saddle pulmonary embolism without acute cor pulmonale (HCC) [I26.92]                   Date / Time: 8/17/2024 10:03 PM    Patient Admission Status: Inpatient   Readmission Risk (Low < 19, Mod (19-27), High > 27): Readmission Risk Score: 6.4    Current PCP: Prasanth Arora MD  PCP verified by CM? Yes    Chart Reviewed: Yes      History Provided by: Patient  Patient Orientation: Alert and Oriented    Patient Cognition: Alert    Hospitalization in the last 30 days (Readmission):  No    If yes, Readmission Assessment in CM Navigator will be completed.    Advance Directives:      Code Status: Full Code   Patient's Primary Decision Maker is:      Primary Decision Maker: CristobalLatisha mccarty - Child - [phone]    Secondary Decision Maker: brina jain - Child - [phone]    Discharge Planning:    Patient lives with: Friends Type of Home: House  Primary Care Giver: Self  Patient Support Systems include: Children, Family Members, Friends/Neighbors   Current Financial resources: Other (Comment) ([place])  Current community resources: None  Current services prior to admission: None            Current DME:              Type of Home Care services:  None    ADLS  Prior functional level: Independent in ADLs/IADLs  Current functional level: Independent in ADLs/IADLs    PT AM-PAC:   /24  OT AM-PAC:   /24    Family can provide assistance at DC: Yes  Would you like Case Management to discuss the discharge plan with any other family members/significant others, and if so, who? Yes (family)  Plans to Return to Present Housing: Yes  Potential

## 2024-08-20 NOTE — PROGRESS NOTES
Vascular    S/P PA Thrombectomy    CP/back pain resolved.   No SOB  Off oxygen  Groin soft without hematoma    Ok to switch to oral anticoagulation  Discharge per Medicine.   F/u prn

## 2024-08-20 NOTE — DISCHARGE INSTRUCTIONS
Discharge Instructions  You were admitted to [facility] with pulmonary embolism.     You are now ready to discharge and will be discharging to: Home     Please take your medications as prescribed. Medication changes are listed below and a full list of medications is in this discharge packet. Please keep your follow-up appointments after the hospital for ongoing care. It has been a pleasure taking care of you, we wish you the best.     MEDICATION CHANGES:  -- Take Eliquis 2 pills in the morning and 2 pills at night for 7 days, then one pill in the morning and one pill at night after that    FOLLOW-UP:  -- It is important to see your primary care provider (PCP) after being in the hospital.    Discharging Physician:   Karissa Sanderson DO  Phone: [phone]

## 2024-08-20 NOTE — DISCHARGE INSTR - COC
Continuity of Care Form    Patient Name: Rafaela Jain   :  1955  MRN:  [id number]    Admit date:  2024  Discharge date:  24    Code Status Order: Full Code   Advance Directives:   Advance Care Flowsheet Documentation             Admitting Physician:  Karissa Sanderson DO  PCP: Prasanth Arora MD    Discharging Nurse: [facility] Unit/Room#: 0217/0217-01  Discharging Unit Phone Number: [phone]    Emergency Contact:   Extended Emergency Contact Information  Primary Emergency Contact: Latisha Jain   [facility]  Home Phone: [phone]  Relation: Child  Secondary Emergency Contact: brina jain  Home Phone: [phone]  Relation: Child    Past Surgical History:  History reviewed. No pertinent surgical history.    Immunization History:   Immunization History   Administered Date(s) Administered    COVID-19, MODERNA BLUE border, Primary or Immunocompromised, (age 12y+), IM, 100 mcg/0.5mL 2021, 2021    COVID-19, MODERNA Bivalent, (age 12y+), IM, 50 mcg/0.5 mL 2022    COVID-19, MODERNA, ( formula), (age 12y+), IM, 50mcg/0.5mL 10/12/2023       Active Problems:  Patient Active Problem List   Diagnosis Code    Cellulitis of pretibial region L03.119    Class 2 obesity in adult E66.9    Elevated BP without diagnosis of hypertension R03.0    Cellulitis L03.90    Pulmonary embolism and infarction (HCC) I26.99    Acute pulmonary embolism with acute cor pulmonale (HCC) I26.09       Isolation/Infection:   Isolation            No Isolation          Patient Infection Status       None to display            Nurse Assessment:  Last Vital Signs: /64   Pulse 91   Temp 98.2 °F (36.8 °C) (Oral)   Resp 18   Ht 1.854 m (6' 1\")   Wt 104.4 kg (230 lb 3.2 oz)   SpO2 98%   BMI 30.37 kg/m²     Last documented pain score (0-10 scale): Pain Level: 6  Last Weight:   Wt Readings from Last 1 Encounters:   24 104.4 kg (230 lb 3.2 oz)     Mental Status:

## 2024-08-20 NOTE — DISCHARGE SUMMARY
Hospital Medicine Discharge Summary    Patient: Rafaela Jain   : 1955     Admit Date: 2024   Discharge Date: 2024    Disposition:  [x]Home   []HHC  []SNF  []Acute Rehab  []LTAC  []Hospice  Code status:  [x]Full  []DNR/CCA  []Limited (DNR/CCA with Do Not Intubate)  []DNRCC  Condition at Discharge: Stable  Primary Care Provider: Prasanth Arora MD    Admitting Provider: Karissa Sanderson DO  Discharge Provider: Karissa Sanderson DO     Discharge Diagnoses:      Active Hospital Problems    Diagnosis     Pulmonary embolism and infarction (HCC) [I26.99]        Presenting Admission History:      Rafaela Jain is a 68 y.o. female with no significant documented medical conditions or home medications presented from home with right mid back pain. Patient started having right mid back and lateral chest wall pain associated with mild shortness of breath, progressively worsening since yesterday, aggravated by deep inspiration and no alleviating factors identified. Upon arrival patient was noted to have bilateral PE. Patient reports that over the past couple of months she has had international travel, prolonged car drive (more than 5 hours) and left ankle fracture in [place]. 2 weeks ago was diagnosed with COVID-19 for which she took Paxlovid. During my evaluation patient was in distress due to pain, alert oriented x 3 hemodynamically stable mildly tachycardic. No fever night sweats chills LOC dizziness nausea vomiting or diarrhea.      Assessment/Plan:      Acute Pulmonary Embolism with Pulmonary Infarction   Acute Cor Pulmonale   - Likely provoked due to recent Covid-19 infection, international travel and left ankle fracture   - CT of chest revealed saddle embolus   - Bilateral lower extremity venous duplex's are pending  - Echo revealed right heart strain   - Vascular surgery consulted. Underwent thrombectomy on   - Transitioned to Eliquis with initiation dosing. Will need to continue this for 6 months

## (undated) DEVICE — EP VASCULAR PACK: Brand: MEDLINE INDUSTRIES, INC.

## (undated) DEVICE — INTRI24™ INTRODUCER SHEATH (SHEATH): Brand: INTRI24 INTRODUCER SHEATH

## (undated) DEVICE — CATHETER ANGIO MPA1 5 FRX125 CM BRAIDED PERFORMA

## (undated) DEVICE — FLOWSAVER: Brand: FLOWSAVER

## (undated) DEVICE — 0.035 STRAIGHT BENTSON GUIDEWIRE

## (undated) DEVICE — GUIDEWIRE: Brand: AMPLATZ SUPER STIFF™

## (undated) DEVICE — PERCLOSE™ PROSTYLE™ SUTURE-MEDIATED CLOSURE AND REPAIR SYSTEM: Brand: PERCLOSE™ PROSTYLE™

## (undated) DEVICE — Device: Brand: TRIEVER24

## (undated) DEVICE — SOLUTION IV HEPARIN SODIUM SODIUM CHL 0.9% 500 ML INJ VIAFLX

## (undated) DEVICE — DILATOR COONS TAPER: Brand: COONS

## (undated) DEVICE — FLOWTRIEVER THROMBECTOMY SYSTEM PRICE PER PROCEDURE

## (undated) DEVICE — PINNACLE PRECISION ACCESS SYSTEM INTRODUCER SHEATH: Brand: PINNACLE PRECISION ACCESS SYSTEM

## (undated) DEVICE — MERITMEDICAL 5F PIG CATHETER

## (undated) DEVICE — NAMIC VASCULAR KIT: Brand: MEDLINE INDUSTRIES, INC.

## (undated) DEVICE — DILATOR WITH AQ, HYDROPHILIC COATING: Brand: COOK